# Patient Record
Sex: MALE | Race: WHITE | NOT HISPANIC OR LATINO
[De-identification: names, ages, dates, MRNs, and addresses within clinical notes are randomized per-mention and may not be internally consistent; named-entity substitution may affect disease eponyms.]

---

## 2018-08-15 PROBLEM — Z00.00 ENCOUNTER FOR PREVENTIVE HEALTH EXAMINATION: Status: ACTIVE | Noted: 2018-08-15

## 2018-09-14 ENCOUNTER — APPOINTMENT (OUTPATIENT)
Dept: ELECTROPHYSIOLOGY | Facility: CLINIC | Age: 73
End: 2018-09-14
Payer: COMMERCIAL

## 2018-09-14 ENCOUNTER — NON-APPOINTMENT (OUTPATIENT)
Age: 73
End: 2018-09-14

## 2018-09-14 VITALS
SYSTOLIC BLOOD PRESSURE: 117 MMHG | HEART RATE: 77 BPM | WEIGHT: 170 LBS | DIASTOLIC BLOOD PRESSURE: 77 MMHG | OXYGEN SATURATION: 98 % | BODY MASS INDEX: 25.76 KG/M2 | TEMPERATURE: 97.5 F | RESPIRATION RATE: 16 BRPM | HEIGHT: 68 IN

## 2018-09-14 DIAGNOSIS — Z84.89 FAMILY HISTORY OF OTHER SPECIFIED CONDITIONS: ICD-10-CM

## 2018-09-14 DIAGNOSIS — Z87.438 PERSONAL HISTORY OF OTHER DISEASES OF MALE GENITAL ORGANS: ICD-10-CM

## 2018-09-14 DIAGNOSIS — Z82.49 FAMILY HISTORY OF ISCHEMIC HEART DISEASE AND OTHER DISEASES OF THE CIRCULATORY SYSTEM: ICD-10-CM

## 2018-09-14 LAB
ALBUMIN SERPL ELPH-MCNC: 4.8 G/DL
ALP BLD-CCNC: 70 U/L
ALT SERPL-CCNC: 18 U/L
ANION GAP SERPL CALC-SCNC: 12 MMOL/L
AST SERPL-CCNC: 22 U/L
BASOPHILS # BLD AUTO: 0.02 K/UL
BASOPHILS NFR BLD AUTO: 0.4 %
BILIRUB SERPL-MCNC: 1.1 MG/DL
BUN SERPL-MCNC: 15 MG/DL
CALCIUM SERPL-MCNC: 9.5 MG/DL
CHLORIDE SERPL-SCNC: 102 MMOL/L
CO2 SERPL-SCNC: 27 MMOL/L
CREAT SERPL-MCNC: 1.34 MG/DL
EOSINOPHIL # BLD AUTO: 0.13 K/UL
EOSINOPHIL NFR BLD AUTO: 2.6 %
HCT VFR BLD CALC: 41.8 %
HGB BLD-MCNC: 14.2 G/DL
IMM GRANULOCYTES NFR BLD AUTO: 0.4 %
LYMPHOCYTES # BLD AUTO: 1.49 K/UL
LYMPHOCYTES NFR BLD AUTO: 29.4 %
MAN DIFF?: NORMAL
MCHC RBC-ENTMCNC: 29.1 PG
MCHC RBC-ENTMCNC: 34 GM/DL
MCV RBC AUTO: 85.7 FL
MONOCYTES # BLD AUTO: 0.54 K/UL
MONOCYTES NFR BLD AUTO: 10.7 %
NEUTROPHILS # BLD AUTO: 2.86 K/UL
NEUTROPHILS NFR BLD AUTO: 56.5 %
PLATELET # BLD AUTO: 178 K/UL
POTASSIUM SERPL-SCNC: 4.5 MMOL/L
PROT SERPL-MCNC: 7.6 G/DL
RBC # BLD: 4.88 M/UL
RBC # FLD: 13.7 %
SODIUM SERPL-SCNC: 141 MMOL/L
WBC # FLD AUTO: 5.06 K/UL

## 2018-09-14 PROCEDURE — 93000 ELECTROCARDIOGRAM COMPLETE: CPT

## 2018-09-14 PROCEDURE — 99205 OFFICE O/P NEW HI 60 MIN: CPT

## 2018-09-14 RX ORDER — CLOBETASOL PROPIONATE 0.5 MG/G
0.05 CREAM TOPICAL 3 TIMES DAILY
Refills: 0 | Status: ACTIVE | COMMUNITY
Start: 2018-09-14

## 2018-09-14 RX ORDER — SIMVASTATIN 20 MG/1
20 TABLET, FILM COATED ORAL DAILY
Qty: 90 | Refills: 3 | Status: DISCONTINUED | COMMUNITY
Start: 2018-09-14 | End: 2018-09-14

## 2018-09-14 RX ORDER — FINASTERIDE 5 MG/1
5 TABLET, FILM COATED ORAL DAILY
Refills: 0 | Status: ACTIVE | COMMUNITY
Start: 2018-09-14

## 2018-09-14 RX ORDER — ASPIRIN 325 MG/1
325 TABLET, FILM COATED ORAL DAILY
Refills: 0 | Status: DISCONTINUED | COMMUNITY
Start: 2018-09-14 | End: 2018-09-14

## 2018-09-14 RX ORDER — ALFUZOSIN HYDROCHLORIDE 10 MG/1
10 TABLET, EXTENDED RELEASE ORAL DAILY
Refills: 0 | Status: ACTIVE | COMMUNITY
Start: 2018-09-14

## 2018-09-17 LAB — ABO + RH PNL BLD: NORMAL

## 2018-10-04 ENCOUNTER — APPOINTMENT (OUTPATIENT)
Dept: CV DIAGNOSITCS | Facility: HOSPITAL | Age: 73
End: 2018-10-04
Payer: COMMERCIAL

## 2018-10-04 ENCOUNTER — OUTPATIENT (OUTPATIENT)
Dept: OUTPATIENT SERVICES | Facility: HOSPITAL | Age: 73
LOS: 1 days | End: 2018-10-04

## 2018-10-04 DIAGNOSIS — I48.91 UNSPECIFIED ATRIAL FIBRILLATION: ICD-10-CM

## 2018-10-04 DIAGNOSIS — K66.8 OTHER SPECIFIED DISORDERS OF PERITONEUM: Chronic | ICD-10-CM

## 2018-10-04 PROCEDURE — 93306 TTE W/DOPPLER COMPLETE: CPT | Mod: 26

## 2018-10-04 PROCEDURE — 76376 3D RENDER W/INTRP POSTPROCES: CPT | Mod: 26

## 2018-10-04 PROCEDURE — 93312 ECHO TRANSESOPHAGEAL: CPT | Mod: 26

## 2018-10-04 RX ORDER — SODIUM CHLORIDE 9 MG/ML
3 INJECTION INTRAMUSCULAR; INTRAVENOUS; SUBCUTANEOUS ONCE
Qty: 0 | Refills: 0 | Status: DISCONTINUED | OUTPATIENT
Start: 2018-10-04 | End: 2018-10-19

## 2018-10-08 ENCOUNTER — INPATIENT (INPATIENT)
Facility: HOSPITAL | Age: 73
LOS: 0 days | Discharge: HOME | End: 2018-10-09
Attending: INTERNAL MEDICINE | Admitting: INTERNAL MEDICINE
Payer: COMMERCIAL

## 2018-10-08 ENCOUNTER — TRANSCRIPTION ENCOUNTER (OUTPATIENT)
Age: 73
End: 2018-10-08

## 2018-10-08 VITALS
WEIGHT: 175.49 LBS | RESPIRATION RATE: 16 BRPM | HEART RATE: 95 BPM | OXYGEN SATURATION: 93 % | DIASTOLIC BLOOD PRESSURE: 63 MMHG | TEMPERATURE: 97 F | HEIGHT: 69 IN | SYSTOLIC BLOOD PRESSURE: 110 MMHG

## 2018-10-08 DIAGNOSIS — K66.8 OTHER SPECIFIED DISORDERS OF PERITONEUM: Chronic | ICD-10-CM

## 2018-10-08 DIAGNOSIS — I48.1 PERSISTENT ATRIAL FIBRILLATION: ICD-10-CM

## 2018-10-08 DIAGNOSIS — E78.5 HYPERLIPIDEMIA, UNSPECIFIED: ICD-10-CM

## 2018-10-08 DIAGNOSIS — N40.0 BENIGN PROSTATIC HYPERPLASIA WITHOUT LOWER URINARY TRACT SYMPTOMS: ICD-10-CM

## 2018-10-08 LAB
APTT BLD: 52.5 SEC — HIGH (ref 27.5–37.4)
BLD GP AB SCN SERPL QL: NEGATIVE — SIGNIFICANT CHANGE UP
BUN SERPL-MCNC: 14 MG/DL — SIGNIFICANT CHANGE UP (ref 7–23)
CALCIUM SERPL-MCNC: 8 MG/DL — LOW (ref 8.4–10.5)
CHLORIDE SERPL-SCNC: 106 MMOL/L — SIGNIFICANT CHANGE UP (ref 98–107)
CO2 SERPL-SCNC: 20 MMOL/L — LOW (ref 22–31)
CREAT SERPL-MCNC: 1.24 MG/DL — SIGNIFICANT CHANGE UP (ref 0.5–1.3)
GLUCOSE SERPL-MCNC: 187 MG/DL — HIGH (ref 70–99)
HCT VFR BLD CALC: 37.4 % — LOW (ref 39–50)
HGB BLD-MCNC: 12.6 G/DL — LOW (ref 13–17)
INR BLD: 1.28 — HIGH (ref 0.88–1.17)
MAGNESIUM SERPL-MCNC: 1.7 MG/DL — SIGNIFICANT CHANGE UP (ref 1.6–2.6)
MCHC RBC-ENTMCNC: 28.5 PG — SIGNIFICANT CHANGE UP (ref 27–34)
MCHC RBC-ENTMCNC: 33.7 % — SIGNIFICANT CHANGE UP (ref 32–36)
MCV RBC AUTO: 84.6 FL — SIGNIFICANT CHANGE UP (ref 80–100)
NRBC # FLD: 0 — SIGNIFICANT CHANGE UP
PHOSPHATE SERPL-MCNC: 3.7 MG/DL — SIGNIFICANT CHANGE UP (ref 2.5–4.5)
PLATELET # BLD AUTO: 142 K/UL — LOW (ref 150–400)
PMV BLD: 10 FL — SIGNIFICANT CHANGE UP (ref 7–13)
POTASSIUM SERPL-MCNC: 3.9 MMOL/L — SIGNIFICANT CHANGE UP (ref 3.5–5.3)
POTASSIUM SERPL-SCNC: 3.9 MMOL/L — SIGNIFICANT CHANGE UP (ref 3.5–5.3)
PROTHROM AB SERPL-ACNC: 14.3 SEC — HIGH (ref 9.8–13.1)
RBC # BLD: 4.42 M/UL — SIGNIFICANT CHANGE UP (ref 4.2–5.8)
RBC # FLD: 12.6 % — SIGNIFICANT CHANGE UP (ref 10.3–14.5)
RH IG SCN BLD-IMP: POSITIVE — SIGNIFICANT CHANGE UP
SODIUM SERPL-SCNC: 141 MMOL/L — SIGNIFICANT CHANGE UP (ref 135–145)
TSH SERPL-MCNC: 1.09 UIU/ML — SIGNIFICANT CHANGE UP (ref 0.27–4.2)
WBC # BLD: 11.75 K/UL — HIGH (ref 3.8–10.5)
WBC # FLD AUTO: 11.75 K/UL — HIGH (ref 3.8–10.5)

## 2018-10-08 PROCEDURE — 93613 INTRACARDIAC EPHYS 3D MAPG: CPT

## 2018-10-08 PROCEDURE — 93010 ELECTROCARDIOGRAM REPORT: CPT

## 2018-10-08 PROCEDURE — 93657 TX L/R ATRIAL FIB ADDL: CPT

## 2018-10-08 PROCEDURE — 93662 INTRACARDIAC ECG (ICE): CPT | Mod: 26

## 2018-10-08 PROCEDURE — 93656 COMPRE EP EVAL ABLTJ ATR FIB: CPT

## 2018-10-08 RX ORDER — HEPARIN SODIUM 5000 [USP'U]/ML
5000 INJECTION INTRAVENOUS; SUBCUTANEOUS EVERY 12 HOURS
Qty: 0 | Refills: 0 | Status: DISCONTINUED | OUTPATIENT
Start: 2018-10-08 | End: 2018-10-08

## 2018-10-08 RX ORDER — TAMSULOSIN HYDROCHLORIDE 0.4 MG/1
0.4 CAPSULE ORAL AT BEDTIME
Qty: 0 | Refills: 0 | Status: DISCONTINUED | OUTPATIENT
Start: 2018-10-08 | End: 2018-10-09

## 2018-10-08 RX ORDER — APIXABAN 2.5 MG/1
5 TABLET, FILM COATED ORAL EVERY 12 HOURS
Qty: 0 | Refills: 0 | Status: DISCONTINUED | OUTPATIENT
Start: 2018-10-08 | End: 2018-10-09

## 2018-10-08 RX ORDER — SUCRALFATE 1 G
1 TABLET ORAL EVERY 6 HOURS
Qty: 0 | Refills: 0 | Status: DISCONTINUED | OUTPATIENT
Start: 2018-10-08 | End: 2018-10-09

## 2018-10-08 RX ORDER — FINASTERIDE 5 MG/1
5 TABLET, FILM COATED ORAL DAILY
Qty: 0 | Refills: 0 | Status: DISCONTINUED | OUTPATIENT
Start: 2018-10-08 | End: 2018-10-09

## 2018-10-08 RX ORDER — ATORVASTATIN CALCIUM 80 MG/1
20 TABLET, FILM COATED ORAL AT BEDTIME
Qty: 0 | Refills: 0 | Status: DISCONTINUED | OUTPATIENT
Start: 2018-10-08 | End: 2018-10-09

## 2018-10-08 RX ORDER — PANTOPRAZOLE SODIUM 20 MG/1
40 TABLET, DELAYED RELEASE ORAL DAILY
Qty: 0 | Refills: 0 | Status: DISCONTINUED | OUTPATIENT
Start: 2018-10-08 | End: 2018-10-09

## 2018-10-08 RX ORDER — SODIUM CHLORIDE 9 MG/ML
3 INJECTION INTRAMUSCULAR; INTRAVENOUS; SUBCUTANEOUS EVERY 8 HOURS
Qty: 0 | Refills: 0 | Status: DISCONTINUED | OUTPATIENT
Start: 2018-10-08 | End: 2018-10-09

## 2018-10-08 RX ORDER — APIXABAN 2.5 MG/1
5 TABLET, FILM COATED ORAL EVERY 12 HOURS
Qty: 0 | Refills: 0 | Status: DISCONTINUED | OUTPATIENT
Start: 2018-10-08 | End: 2018-10-08

## 2018-10-08 RX ORDER — SUCRALFATE 1 G
1 TABLET ORAL EVERY 12 HOURS
Qty: 0 | Refills: 0 | Status: DISCONTINUED | OUTPATIENT
Start: 2018-10-08 | End: 2018-10-08

## 2018-10-08 RX ADMIN — SODIUM CHLORIDE 3 MILLILITER(S): 9 INJECTION INTRAMUSCULAR; INTRAVENOUS; SUBCUTANEOUS at 17:44

## 2018-10-08 RX ADMIN — APIXABAN 5 MILLIGRAM(S): 2.5 TABLET, FILM COATED ORAL at 22:05

## 2018-10-08 RX ADMIN — PANTOPRAZOLE SODIUM 40 MILLIGRAM(S): 20 TABLET, DELAYED RELEASE ORAL at 18:18

## 2018-10-08 RX ADMIN — SODIUM CHLORIDE 3 MILLILITER(S): 9 INJECTION INTRAMUSCULAR; INTRAVENOUS; SUBCUTANEOUS at 22:00

## 2018-10-08 RX ADMIN — ATORVASTATIN CALCIUM 20 MILLIGRAM(S): 80 TABLET, FILM COATED ORAL at 22:05

## 2018-10-08 RX ADMIN — Medication 1 GRAM(S): at 18:18

## 2018-10-08 RX ADMIN — FINASTERIDE 5 MILLIGRAM(S): 5 TABLET, FILM COATED ORAL at 18:18

## 2018-10-08 RX ADMIN — TAMSULOSIN HYDROCHLORIDE 0.4 MILLIGRAM(S): 0.4 CAPSULE ORAL at 22:05

## 2018-10-08 NOTE — DISCHARGE NOTE ADULT - CARE PLAN
Principal Discharge DX:	Persistent atrial fibrillation  Goal:	To control your rate and rhythm.  Assessment and plan of treatment:	You underwent an ablation procedure where your rhythm was changed to a normal one. This was successful and you are no in a regular heart rhythm. You will have to continue your blood thinning medication (Apixaban), and you will follow up with your EP doctor (Dr. Chu).  Secondary Diagnosis:	Hyperlipidemia, unspecified hyperlipidemia type  Goal:	Continuing your home medications.  Assessment and plan of treatment:	You were continued on your home medications as prescribed.  Secondary Diagnosis:	Benign prostatic hyperplasia, unspecified whether lower urinary tract symptoms present  Goal:	Giving you medications for your BPH.  Assessment and plan of treatment:	You were given medications for your BPH. You will continue your home regimen once discharged (Finasteride and Alfuzosin) Principal Discharge DX:	Persistent atrial fibrillation  Goal:	To control your rate and rhythm.  Assessment and plan of treatment:	You underwent an ablation procedure where your rhythm was changed to a normal one. This was successful and you are no in a regular heart rhythm. You will have to continue your blood thinning medication (Apixaban), and you will follow up with your EP doctor (Dr. Chu).  Secondary Diagnosis:	Hyperlipidemia, unspecified hyperlipidemia type  Goal:	Continuing your home medications.  Assessment and plan of treatment:	You were continued on your home medications as prescribed.  Secondary Diagnosis:	Benign prostatic hyperplasia, unspecified whether lower urinary tract symptoms present  Goal:	Giving you medications for your BPH.  Assessment and plan of treatment:	You were given medications for your BPH. You will continue your home regimen once discharged (Finasteride and Alfuzosin).  Your outpatient Urologist (Dr. Canales) was contacted and will follow up with him immediately after leaving the hospital.

## 2018-10-08 NOTE — DISCHARGE NOTE ADULT - PLAN OF CARE
To control your rate and rhythm. You underwent an ablation procedure where your rhythm was changed to a normal one. This was successful and you are no in a regular heart rhythm. You will have to continue your blood thinning medication (Apixaban), and you will follow up with your EP doctor (Dr. Chu). Continuing your home medications. You were continued on your home medications as prescribed. Giving you medications for your BPH. You were given medications for your BPH. You will continue your home regimen once discharged (Finasteride and Alfuzosin) You were given medications for your BPH. You will continue your home regimen once discharged (Finasteride and Alfuzosin).  Your outpatient Urologist (Dr. Canales) was contacted and will follow up with him immediately after leaving the hospital.

## 2018-10-08 NOTE — PROGRESS NOTE ADULT - ASSESSMENT
74 y/o male with PMHx of Atrial Fibrillation (s/p 2 DCCV attempts), BPH, HLD, is now transferred to CCU s/p successful Atrial fibrillation in stable condition.

## 2018-10-08 NOTE — H&P CARDIOLOGY - REVIEW OF SYSTEMS
The patient denies chest pain, SOB, palpitations, dizziness, b/l lower extremities edema, presyncope, syncope, melena, hematochezia, fever, chills, abdominal pain, N/v/D/C, urinary symptoms.

## 2018-10-08 NOTE — PROGRESS NOTE ADULT - SUBJECTIVE AND OBJECTIVE BOX
Patient seen and examined at bedside.    Interval Events: Patient is status post Atrial Fibrillation ablation.     Review Of Systems: No chest pain, shortness of breath, or palpitations            Current Meds:  atorvastatin 20 milliGRAM(s) Oral at bedtime  finasteride 5 milliGRAM(s) Oral daily  pantoprazole  Injectable 40 milliGRAM(s) IV Push daily  sodium chloride 0.9% lock flush 3 milliLiter(s) IV Push every 8 hours  sucralfate 1 Gram(s) Oral every 6 hours  tamsulosin 0.4 milliGRAM(s) Oral at bedtime      Vitals:  T(F): Afebrile  HR: 91  BP: 110/63  RR: 14  SpO2: 93% Face Mask   I&O's Summary      Physical Exam:  Appearance: No acute distress; well appearing  Eyes: PERRL, EOMI, pink conjunctiva  HENT: Normal oral mucosa  Cardiovascular: RRR, S1,S2 3/6 systolic ejection murmur loudest at M area.   Respiratory: Clear to auscultation bilaterally  Gastrointestinal: soft, non-tender, non-distended with normal bowel sounds  Musculoskeletal: No clubbing; no joint deformity   Neurologic: Non-focal  Lymphatic: No lymphadenopathy  Psychiatry: AAOx3, mood & affect appropriate  Skin: No rashes, ecchymoses, or cyanosis. Access site c/d/i       New ECG(s): Personally reviewed    Echo:    < from: [facility] w/TTE (w/3D Echo) (10.04.18 @ 07:56) >  DIMENSIONS:  Dimensions:     Normal Values:  LA:     4.3 cm    2.0 - 4.0 cm  Ao:     4.0 cm    2.0 - 3.8 cm  SEPTUM: 0.8 cm    0.6 - 1.2 cm  PWT:    0.8 cm    0.6 - 1.1 cm  LVIDd:  5.0 cm    3.0 - 5.6 cm  LVIDs:  3.3 cm    1.8 - 4.0 cm  Derived Variables:  LVMI: 71 g/m2  RWT: 0.32  Fractional short: 34 %  Ejection Fraction (Cristobalicholtz): 63 %  ------------------------------------------------------------------------  OBSERVATIONS:  Mitral Valve: Bileaflet mitral valve prolapse.  Moderate-severe mitral regurgitation.  Two jets of MR are  noted - one is central in origin and direction; the other  is highly eccentric and posteriorly directed.  Blunting of  systolic pulmonary venous flow is noted.  Aortic Root: Normal aortic root, aortic arch, and  descending thoracic aorta.  Aortic Valve: Calcified trileaflet aortic valve with normal  opening. Mild-moderate aortic regurgitation.  Vena  contracta width about  0.3 cm.  Left Atrium: Severely dilated left atrium.  LA volume index  = 59 cc/m2.  No left atrium or left atrial appendage  thrombus.  Left Ventricle: Normal left ventricular systolic function.  No segmental wall motion abnormalities. Normal left  ventricular internal dimensions and wall thicknesses.  Right Heart: Mild right atrial enlargement. Normal right  ventricular size and function. Normal tricuspid valve.  Mild tricuspid regurgitation. Normal pulmonic valve.  Mild-moderate pulmonic regurgitation.  Pericardium/PleuraNormal pericardium with no pericardial  effusion.  ------------------------------------------------------------------------  CONCLUSIONS:  1. Bileaflet mitral valve prolapse. Moderate-severe mitral  regurgitation.  Two jets of MR are noted - one is central  in origin and direction; the other is highly eccentric and  posteriorly directed.  Blunting of systolic pulmonary  venous flow is noted.  2. Calcified trileaflet aortic valve with normal opening.  Mild-moderate aortic regurgitation.  Vena contracta width  about  0.3 cm.  3. Normal aortic root, aortic arch, and descending thoracic  aorta.  4. Severely dilated left atrium.  LA volume index = 59  cc/m2.  No left atrium or left atrial appendage thrombus.  5. Normal left ventricular internal dimensions and wall  thicknesses.  6. Normal left ventricular systolic function. No segmental  wall motion abnormalities.  7. Normal right ventricular size and function.  8. Contrast injection demonstrates no evidence of a patent  foramen ovale.    < end of copied text >      Interpretation of Telemetry: Normal Sinus Rhythm at 90's.

## 2018-10-08 NOTE — H&P CARDIOLOGY - HISTORY OF PRESENT ILLNESS
73 y.o. male presents today for elective A. Fib. Ablation.   see hard copy of H&P from [employer] in patient's chart.   The patient denies any new complaints since the last time he was seen by Dr. Chu.

## 2018-10-08 NOTE — PROGRESS NOTE ADULT - PROBLEM SELECTOR PLAN 1
Now s/p Atrial Fibrillation now converted to NSR not on rate control at home.     - Will resume anticoagulation 6 hours after procedure 8PM. Now s/p Atrial Fibrillation now converted to NSR not on rate control at home.     - Will resume anticoagulation (Apixaban 5 mg BID) 6 hours after procedure at 8PM 10/8.

## 2018-10-08 NOTE — PROGRESS NOTE ADULT - PROBLEM SELECTOR PLAN 2
- Will start Tamsulosin inpatient, patient can resume home BPH meds after discharge.  - c/w Finasteride 5 mg PO qd.

## 2018-10-08 NOTE — DISCHARGE NOTE ADULT - CONDITIONS AT DISCHARGE
Patient A&O, making needs known, pain free.  Patient right groin site with ecchymosis noted, soft, no hematoma. B/L LE cool to touch, +doppler pulses.  Vital signs stable.  Patient with difficulty to empty bladder completely.  MD aware.  Patient being discharged to urologist's office.

## 2018-10-08 NOTE — DISCHARGE NOTE ADULT - HOSPITAL COURSE
74 y/o male with PMHx of Atrial Fibrillation (s/p 2 DCCV attempts), BPH, HLD underwent successful Atiral Fibrillation ablation and being cared for in CCU restarted on AC 6 hours after procedure (Apixaban 5 mg BID). 72 y/o male with PMHx of Atrial Fibrillation (s/p 2 DCCV attempts), BPH, HLD underwent successful Atiral Fibrillation ablation and being cared for in CCU restarted on AC 6 hours after procedure (Apixaban 5 mg BID). Patent needed a chris placed in, and was taken out with a TOV. 72 y/o male with PMHx of Atrial Fibrillation (s/p 2 DCCV attempts), BPH, HLD underwent successful Atiral Fibrillation ablation and being cared for in CCU restarted on AC 6 hours after procedure (Apixaban 5 mg BID). Patent needed a chris placed in, and was taken out with a TOV however patient has had PVR greater than 300 cc. Patient did not feel comfortable putting the chris in again and wanted to edie. His outpatient Urologist was contacted and will follow up with him immediately after leaving the hospital. Clinically stable for discharge. 74 y/o male with PMHx of Atrial Fibrillation (s/p 2 DCCV attempts), BPH, HLD underwent successful Atiral Fibrillation ablation and being cared for in CCU restarted on AC 6 hours after procedure (Apixaban 5 mg BID). Patient with hx of BPH, post procedure noted to have some difficulty urinating however passed TOV. Case discussed with Urology and close patient follow up scheduled. Clinically stable for discharge.

## 2018-10-08 NOTE — DISCHARGE NOTE ADULT - ADDITIONAL INSTRUCTIONS
You will follow up with your EP doctor (Dr. Chu). You will follow up with your EP doctor (Dr. Chu) on 10/23/18 4:30PM at his office. You will follow up with your EP doctor (Dr. Chu) on 10/23/18 4:30PM at his office.  You will follow up with your Urologist Dr. Canales immediately after being discharged from the hospital.

## 2018-10-08 NOTE — DISCHARGE NOTE ADULT - OTHER SIGNIFICANT FINDINGS
< from: [facility] w/TTE (w/3D Echo) (10.04.18 @ 07:56) >  CONCLUSIONS:  1. Bileaflet mitral valve prolapse. Moderate-severe mitral  regurgitation.  Two jets of MR are noted - one is central  in origin and direction; the other is highly eccentric and  posteriorly directed.  Blunting of systolic pulmonary  venous flow is noted.  2. Calcified trileaflet aortic valve with normal opening.  Mild-moderate aortic regurgitation.  Vena contracta width  about  0.3 cm.  3. Normal aortic root, aortic arch, and descending thoracic  aorta.  4. Severely dilated left atrium.  LA volume index = 59  cc/m2.  No left atrium or left atrial appendage thrombus.  5. Normal left ventricular internal dimensions and wall  thicknesses.  6. Normal left ventricular systolic function. No segmental  wall motion abnormalities.  7. Normal right ventricular size and function.  8. Contrast injection demonstrates no evidence of a patent  foramen ovale.    < end of copied text >

## 2018-10-08 NOTE — DISCHARGE NOTE ADULT - INSTRUCTIONS
Continue your diet as you were prior to admission. Monitor right groin site for swelling, bruising, drainage, bleeding, or pain not relieved by tylenol

## 2018-10-08 NOTE — DISCHARGE NOTE ADULT - MEDICATION SUMMARY - MEDICATIONS TO TAKE
I will START or STAY ON the medications listed below when I get home from the hospital:    finasteride 5 mg oral tablet  -- 1 tab(s) by mouth once a day  -- Indication: For Benign prostatic hyperplasia, unspecified whether lower urinary tract symptoms present    alfuzosin 10 mg oral tablet, extended release  -- 1 tab(s) by mouth once a day  -- Indication: For Benign prostatic hyperplasia, unspecified whether lower urinary tract symptoms present    Eliquis 5 mg oral tablet  -- 1 tab(s) by mouth 2 times a day  -- Indication: For Persistent atrial fibrillation    Lipitor 20 mg oral tablet  -- 1 tab(s) by mouth Tuesday, Thursday, Saturday, Sunday  -- Indication: For Hyperlipidemia, unspecified hyperlipidemia type    clobetasol topical 0.05% topical cream  -- Apply on skin to affected area 2 times a day  -- Indication: For Benign prostatic hyperplasia, unspecified whether lower urinary tract symptoms present    sucralfate 1 g oral tablet  -- 1 tab(s) by mouth every 6 hours  -- Indication: For Persistent atrial fibrillation    PriLOSEC OTC 20 mg oral delayed release tablet  -- 1 tab(s) by mouth once a day   -- Obtain medical advice before taking any non-prescription drugs as some may affect the action of this medication.  Swallow whole.  Do not crush.    -- Indication: For Persistent atrial fibrillation

## 2018-10-08 NOTE — DISCHARGE NOTE ADULT - SECONDARY DIAGNOSIS.
Hyperlipidemia, unspecified hyperlipidemia type Benign prostatic hyperplasia, unspecified whether lower urinary tract symptoms present

## 2018-10-08 NOTE — DISCHARGE NOTE ADULT - PATIENT PORTAL LINK FT
You can access the [email or web address] Patient Portal, offered by [facility], by registering with the following website: [email or web address]

## 2018-10-08 NOTE — CHART NOTE - NSCHARTNOTEFT_GEN_A_CORE
Type of Procedure: pulmonary vein isolation and atrial flutter ablation  Licensed independent practitioner: Fransico Chu MD  Assistant: none  Description of procedure: sterile conditions, right femoral venous access, heparin, transseptal, PVI x 4, atrial flutter ablation  Findings of procedure: PVI, CTI atrial flutter  Estimated blood loss: < 10 cc  Specimen removed: none  Preoperative Dx: persistent atrial fibrillation  Postoperative Dx: persistent atrial fibrillation and CTI atrial flutter  Complications: none  Anesthesia type: general with local  Resume anticoagulation after 6 hours    Fransico Chu MD

## 2018-10-09 VITALS
HEART RATE: 90 BPM | OXYGEN SATURATION: 99 % | SYSTOLIC BLOOD PRESSURE: 120 MMHG | RESPIRATION RATE: 17 BRPM | DIASTOLIC BLOOD PRESSURE: 54 MMHG

## 2018-10-09 LAB
ALBUMIN SERPL ELPH-MCNC: 3.7 G/DL — SIGNIFICANT CHANGE UP (ref 3.3–5)
ALP SERPL-CCNC: 58 U/L — SIGNIFICANT CHANGE UP (ref 40–120)
ALT FLD-CCNC: 14 U/L — SIGNIFICANT CHANGE UP (ref 4–41)
APPEARANCE UR: CLEAR — SIGNIFICANT CHANGE UP
AST SERPL-CCNC: 27 U/L — SIGNIFICANT CHANGE UP (ref 4–40)
BACTERIA # UR AUTO: NEGATIVE — SIGNIFICANT CHANGE UP
BASOPHILS # BLD AUTO: 0.01 K/UL — SIGNIFICANT CHANGE UP (ref 0–0.2)
BASOPHILS NFR BLD AUTO: 0.1 % — SIGNIFICANT CHANGE UP (ref 0–2)
BILIRUB SERPL-MCNC: 1 MG/DL — SIGNIFICANT CHANGE UP (ref 0.2–1.2)
BILIRUB UR-MCNC: NEGATIVE — SIGNIFICANT CHANGE UP
BLOOD UR QL VISUAL: HIGH
BUN SERPL-MCNC: 14 MG/DL — SIGNIFICANT CHANGE UP (ref 7–23)
CALCIUM SERPL-MCNC: 7.8 MG/DL — LOW (ref 8.4–10.5)
CHLORIDE SERPL-SCNC: 105 MMOL/L — SIGNIFICANT CHANGE UP (ref 98–107)
CO2 SERPL-SCNC: 21 MMOL/L — LOW (ref 22–31)
COLOR SPEC: YELLOW — SIGNIFICANT CHANGE UP
CREAT SERPL-MCNC: 1.14 MG/DL — SIGNIFICANT CHANGE UP (ref 0.5–1.3)
EOSINOPHIL # BLD AUTO: 0 K/UL — SIGNIFICANT CHANGE UP (ref 0–0.5)
EOSINOPHIL NFR BLD AUTO: 0 % — SIGNIFICANT CHANGE UP (ref 0–6)
GLUCOSE SERPL-MCNC: 138 MG/DL — HIGH (ref 70–99)
GLUCOSE UR-MCNC: NEGATIVE — SIGNIFICANT CHANGE UP
HCT VFR BLD CALC: 35.7 % — LOW (ref 39–50)
HGB BLD-MCNC: 12.1 G/DL — LOW (ref 13–17)
HYALINE CASTS # UR AUTO: SIGNIFICANT CHANGE UP
IMM GRANULOCYTES # BLD AUTO: 0.07 # — SIGNIFICANT CHANGE UP
IMM GRANULOCYTES NFR BLD AUTO: 0.7 % — SIGNIFICANT CHANGE UP (ref 0–1.5)
KETONES UR-MCNC: SIGNIFICANT CHANGE UP
LEUKOCYTE ESTERASE UR-ACNC: NEGATIVE — SIGNIFICANT CHANGE UP
LYMPHOCYTES # BLD AUTO: 0.6 K/UL — LOW (ref 1–3.3)
LYMPHOCYTES # BLD AUTO: 5.7 % — LOW (ref 13–44)
MAGNESIUM SERPL-MCNC: 1.9 MG/DL — SIGNIFICANT CHANGE UP (ref 1.6–2.6)
MCHC RBC-ENTMCNC: 29.1 PG — SIGNIFICANT CHANGE UP (ref 27–34)
MCHC RBC-ENTMCNC: 33.9 % — SIGNIFICANT CHANGE UP (ref 32–36)
MCV RBC AUTO: 85.8 FL — SIGNIFICANT CHANGE UP (ref 80–100)
MONOCYTES # BLD AUTO: 1.25 K/UL — HIGH (ref 0–0.9)
MONOCYTES NFR BLD AUTO: 11.9 % — SIGNIFICANT CHANGE UP (ref 2–14)
NEUTROPHILS # BLD AUTO: 8.58 K/UL — HIGH (ref 1.8–7.4)
NEUTROPHILS NFR BLD AUTO: 81.6 % — HIGH (ref 43–77)
NITRITE UR-MCNC: NEGATIVE — SIGNIFICANT CHANGE UP
NRBC # FLD: 0 — SIGNIFICANT CHANGE UP
PH UR: 6 — SIGNIFICANT CHANGE UP (ref 5–8)
PHOSPHATE SERPL-MCNC: 3.4 MG/DL — SIGNIFICANT CHANGE UP (ref 2.5–4.5)
PLATELET # BLD AUTO: 144 K/UL — LOW (ref 150–400)
PMV BLD: 10.4 FL — SIGNIFICANT CHANGE UP (ref 7–13)
POTASSIUM SERPL-MCNC: 4.3 MMOL/L — SIGNIFICANT CHANGE UP (ref 3.5–5.3)
POTASSIUM SERPL-SCNC: 4.3 MMOL/L — SIGNIFICANT CHANGE UP (ref 3.5–5.3)
PROT SERPL-MCNC: 5.9 G/DL — LOW (ref 6–8.3)
PROT UR-MCNC: NEGATIVE — SIGNIFICANT CHANGE UP
RBC # BLD: 4.16 M/UL — LOW (ref 4.2–5.8)
RBC # FLD: 12.9 % — SIGNIFICANT CHANGE UP (ref 10.3–14.5)
RBC CASTS # UR COMP ASSIST: >50 — HIGH (ref 0–?)
SODIUM SERPL-SCNC: 138 MMOL/L — SIGNIFICANT CHANGE UP (ref 135–145)
SP GR SPEC: 1.02 — SIGNIFICANT CHANGE UP (ref 1–1.04)
SQUAMOUS # UR AUTO: SIGNIFICANT CHANGE UP
UROBILINOGEN FLD QL: NORMAL — SIGNIFICANT CHANGE UP
WBC # BLD: 10.51 K/UL — HIGH (ref 3.8–10.5)
WBC # FLD AUTO: 10.51 K/UL — HIGH (ref 3.8–10.5)
WBC UR QL: SIGNIFICANT CHANGE UP (ref 0–?)

## 2018-10-09 PROCEDURE — 99233 SBSQ HOSP IP/OBS HIGH 50: CPT

## 2018-10-09 RX ORDER — OMEPRAZOLE 10 MG/1
1 CAPSULE, DELAYED RELEASE ORAL
Qty: 30 | Refills: 0
Start: 2018-10-09 | End: 2018-11-07

## 2018-10-09 RX ORDER — SUCRALFATE 1 G
1 TABLET ORAL
Qty: 120 | Refills: 0
Start: 2018-10-09 | End: 2018-11-07

## 2018-10-09 RX ORDER — APIXABAN 2.5 MG/1
1 TABLET, FILM COATED ORAL
Qty: 0 | Refills: 0 | COMMUNITY

## 2018-10-09 RX ORDER — APIXABAN 2.5 MG/1
1 TABLET, FILM COATED ORAL
Qty: 60 | Refills: 0
Start: 2018-10-09 | End: 2018-11-07

## 2018-10-09 RX ADMIN — SODIUM CHLORIDE 3 MILLILITER(S): 9 INJECTION INTRAMUSCULAR; INTRAVENOUS; SUBCUTANEOUS at 05:45

## 2018-10-09 RX ADMIN — Medication 1 GRAM(S): at 08:25

## 2018-10-09 RX ADMIN — PANTOPRAZOLE SODIUM 40 MILLIGRAM(S): 20 TABLET, DELAYED RELEASE ORAL at 12:01

## 2018-10-09 RX ADMIN — Medication 1 GRAM(S): at 01:56

## 2018-10-09 RX ADMIN — SODIUM CHLORIDE 3 MILLILITER(S): 9 INJECTION INTRAMUSCULAR; INTRAVENOUS; SUBCUTANEOUS at 13:42

## 2018-10-09 RX ADMIN — Medication 1 GRAM(S): at 12:01

## 2018-10-09 RX ADMIN — APIXABAN 5 MILLIGRAM(S): 2.5 TABLET, FILM COATED ORAL at 11:26

## 2018-10-09 NOTE — PROGRESS NOTE ADULT - ASSESSMENT
72 y/o male with PMHx of Atrial Fibrillation (s/p 2 DCCV attempts), BPH, HLD, is now transferred to CCU s/p successful Atrial fibrillation and restarted on home AC. Maintaining sinus rhythm. Post procedure instructions given to patient and he demonstrates understanding of the instructions. Right groin clean, dry and intact with no bleeding or hematoma.   - Continue Apixaban 5 mg PO BID.  - Continue other home medications  - May D/C home patient is able to urinate (S/p Muir removal) 74 y/o male with PMHx of Atrial Fibrillation (s/p 2 DCCV attempts), BPH, HLD, is now transferred to CCU s/p successful Atrial fibrillation and restarted on home AC. Maintaining sinus rhythm. Post procedure instructions given to patient and he demonstrates understanding of the instructions. Right groin clean, dry and intact with no bleeding or hematoma.   - Continue Apixaban 5 mg PO BID.  - Continue other home medications  - May D/C home patient is able to urinate (S/p Muir removal)  - F/u appointment with device clinic on 10/23/18 @ 4:30 pm

## 2018-10-09 NOTE — PROGRESS NOTE ADULT - ASSESSMENT
74 y/o male with PMHx of Atrial Fibrillation (s/p 2 DCCV attempts), BPH, HLD, is now transferred to CCU s/p successful Atrial fibrillation in stable condition with a-line now removed and restarted on home AC.

## 2018-10-09 NOTE — PROGRESS NOTE ADULT - SUBJECTIVE AND OBJECTIVE BOX
Patient is seen and examined. S/P afib ablation yesterday. No further afib noted on telemetry. Denies chest pain, SOB, palpitations or dizziness.   PAST MEDICAL & SURGICAL HISTORY:  BPH (benign prostatic hyperplasia)  Afib: on Eliquis  Hypertension  Hyperlipidemia  Abdominal cyst: s/p anal cyst removal      MEDICATIONS  (STANDING):  apixaban 5 milliGRAM(s) Oral every 12 hours  atorvastatin 20 milliGRAM(s) Oral at bedtime  finasteride 5 milliGRAM(s) Oral daily  pantoprazole  Injectable 40 milliGRAM(s) IV Push daily  sodium chloride 0.9% lock flush 3 milliLiter(s) IV Push every 8 hours  sucralfate 1 Gram(s) Oral every 6 hours  tamsulosin 0.4 milliGRAM(s) Oral at bedtime    MEDICATIONS  (PRN):    Vital Signs Last 24 Hrs  T(C): 36.7 (09 Oct 2018 07:30), Max: 36.7 (09 Oct 2018 00:00)  T(F): 98.1 (09 Oct 2018 07:30), Max: 98.1 (09 Oct 2018 00:00)  HR: 86 (09 Oct 2018 10:00) (79 - 99)  BP: 106/51 (09 Oct 2018 10:00) (105/53 - 126/75)  BP(mean): 64 (09 Oct 2018 10:00) (64 - 85)  RR: 20 (09 Oct 2018 10:00) (14 - 21)  SpO2: 98% (09 Oct 2018 10:00) (93% - 100%)    INTERPRETATION OF TELEMETRY: Sinus rhythm with HR 70s-80s    LABS:                        12.1   10.51 )-----------( 144      ( 09 Oct 2018 02:45 )             35.7     10-09    138  |  105  |  14  ----------------------------<  138<H>  4.3   |  21<L>  |  1.14    Ca    7.8<L>      09 Oct 2018 02:45  Phos  3.4     10  Mg     1.9     10-09    TPro  5.9<L>  /  Alb  3.7  /  TBili  1.0  /  DBili  x   /  AST  27  /  ALT  14  /  AlkPhos  58  10-09        PT/INR - ( 08 Oct 2018 15:30 )   PT: 14.3 SEC;   INR: 1.28          PTT - ( 08 Oct 2018 15:30 )  PTT:52.5 SEC  Urinalysis Basic - ( 09 Oct 2018 01:30 )    Color: YELLOW / Appearance: CLEAR / S.017 / pH: 6.0  Gluc: NEGATIVE / Ketone: TRACE  / Bili: NEGATIVE / Urobili: NORMAL   Blood: MODERATE / Protein: NEGATIVE / Nitrite: NEGATIVE   Leuk Esterase: NEGATIVE / RBC: >50 / WBC 0-2   Sq Epi: OCC / Non Sq Epi: x / Bacteria: NEGATIVE      I&O's Summary    08 Oct 2018 07:  -  09 Oct 2018 07:00  --------------------------------------------------------  IN: 300 mL / OUT: 1000 mL / NET: -700 mL    09 Oct 2018 07:  -  09 Oct 2018 10:45  --------------------------------------------------------  IN: 390 mL / OUT: 0 mL / NET: 390 mL          PHYSICAL EXAM:    GENERAL: In no apparent distress, well nourished, and hydrated.  HEAD:  Atraumatic, Normocephalic  HEART: Regular rate and rhythm; No murmurs, rubs, or gallops.  PULMONARY: Clear to auscultation and percussion.  No rales, wheezing, or rhonchi bilaterally.  ABDOMEN: Soft, Nontender, Nondistended; Bowel sounds present  EXTREMITIES:  2+ Peripheral Pulses, No clubbing, cyanosis, or edema

## 2018-10-09 NOTE — PROGRESS NOTE ADULT - PROBLEM SELECTOR PLAN 1
Now s/p Atrial Fibrillation now converted to NSR not on rate control at home.     - Resumed AC (Apixaban 5 mg PO BID).  - Has remained in NSR throughout the night.

## 2018-10-09 NOTE — PROGRESS NOTE ADULT - SUBJECTIVE AND OBJECTIVE BOX
Patient seen and examined at bedside.    Overnight Events: No acute events. Patient was having difficulty voiding a chris was placed. It was removed at approximately 6:30AM. A-line was removed.     Review Of Systems: No chest pain, shortness of breath, or palpitations. Denies any pain or discomfort at right femoral access site.        Current Meds:  apixaban 5 milliGRAM(s) Oral every 12 hours  atorvastatin 20 milliGRAM(s) Oral at bedtime  finasteride 5 milliGRAM(s) Oral daily  pantoprazole  Injectable 40 milliGRAM(s) IV Push daily  sodium chloride 0.9% lock flush 3 milliLiter(s) IV Push every 8 hours  sucralfate 1 Gram(s) Oral every 6 hours  tamsulosin 0.4 milliGRAM(s) Oral at bedtime      Vitals:  T(F): 98.1 (10-09), Max: 98.1 (10-09)  HR: 85 (10-09) (79 - 99)  BP: 126/75 (10-08) (110/63 - 126/75)  RR: 18 (10-09)  SpO2: 97% (10-09)  I&O's Summary    08 Oct 2018 07:01  -  09 Oct 2018 07:00  --------------------------------------------------------  IN: 300 mL / OUT: 1000 mL / NET: -700 mL        Physical Exam:  Appearance: No acute distress; well appearing  Eyes: PERRL, EOMI, pink conjunctiva  HENT: Normal oral mucosa  Cardiovascular: RRR, S1,S2 3/6 systolic ejection murmur loudest at M area.   Respiratory: Clear to auscultation bilaterally  Gastrointestinal: soft, non-tender, non-distended with normal bowel sounds  Musculoskeletal: No clubbing; no joint deformity   Neurologic: Non-focal  Lymphatic: No lymphadenopathy  Psychiatry: AAOx3, mood & affect appropriate  Skin: No rashes, ecchymoses, or cyanosis. Access site c/d/i                           12.1   10.51 )-----------( 144      ( 09 Oct 2018 02:45 )             35.7     10-09    138  |  105  |  14  ----------------------------<  138<H>  4.3   |  21<L>  |  1.14    Ca    7.8<L>      09 Oct 2018 02:45  Phos  3.4     10-09  Mg     1.9     10-09    TPro  5.9<L>  /  Alb  3.7  /  TBili  1.0  /  DBili  x   /  AST  27  /  ALT  14  /  AlkPhos  58  10-09    PT/INR - ( 08 Oct 2018 15:30 )   PT: 14.3 SEC;   INR: 1.28          PTT - ( 08 Oct 2018 15:30 )  PTT:52.5 SEC        Interpretation of Telemetry: NSR

## 2018-10-09 NOTE — CONSULT NOTE ADULT - SUBJECTIVE AND OBJECTIVE BOX
Patient is a 73y old  Male who presents with a chief complaint of Atrial Fibrillation Ablation (09 Oct 2018 07:24)      INTERVAL HPI/OVERNIGHT EVENTS: none     MEDICATIONS  (STANDING):  apixaban 5 milliGRAM(s) Oral every 12 hours  atorvastatin 20 milliGRAM(s) Oral at bedtime  finasteride 5 milliGRAM(s) Oral daily  pantoprazole  Injectable 40 milliGRAM(s) IV Push daily  sodium chloride 0.9% lock flush 3 milliLiter(s) IV Push every 8 hours  sucralfate 1 Gram(s) Oral every 6 hours  tamsulosin 0.4 milliGRAM(s) Oral at bedtime    MEDICATIONS  (PRN):            Allergies    penicillin (Hives)    Intolerances        REVIEW OF SYSTEMS:  CARDIOVASCULAR: No chest pain, palpitations, dizziness, or leg swelling; no shortness of breath     RESPIRATORY: No cough, wheezing, chills or hemoptysis; No shortness of breath    GASTROINTESTINAL: No abdominal or epigastric pain. No nausea, vomiting, or hematemesis; No diarrhea or constipation. No melena or hematochezia.    NEUROLOGICAL: No headaches, memory loss, loss of strength, numbness      PHYSICAL EXAM:  Vital Signs Last 24 Hrs  T(C): 36.7 (09 Oct 2018 07:30), Max: 36.7 (09 Oct 2018 00:00)  T(F): 98.1 (09 Oct 2018 07:30), Max: 98.1 (09 Oct 2018 00:00)  HR: 85 (09 Oct 2018 08:00) (79 - 99)  BP: 105/53 (09 Oct 2018 08:00) (105/53 - 126/75)  BP(mean): 65 (09 Oct 2018 08:00) (65 - 85)  RR: 16 (09 Oct 2018 08:00) (14 - 21)  SpO2: 97% (09 Oct 2018 08:00) (93% - 100%)    GENERAL: NAD, well-groomed, well-developed  HEAD:  Atraumatic, Normocephalic  EYES: EOMI, PERRLA, conjunctiva and sclera clear  NECK: Supple, No JVD, Normal thyroid  NERVOUS SYSTEM:  Alert & Oriented X3, Good concentration;  and symmetric  CHEST/LUNG: Clear to auscultation bilaterally; No rales, rhonchi, wheezing, or rubs  HEART: S1S2 regular, without murmur, rub nor gallop  ABDOMEN: Soft, Nontender, Nondistended; Bowel sounds present  EXTREMITIES:  2+ Peripheral Pulses, No clubbing, cyanosis, or edema      LABS:                        12.1   10.51 )-----------( 144      ( 09 Oct 2018 02:45 )             35.7     09 Oct 2018 02:45    138    |  105    |  14     ----------------------------<  138    4.3     |  21     |  1.14     Ca    7.8        09 Oct 2018 02:45  Phos  3.4       09 Oct 2018 02:45  Mg     1.9       09 Oct 2018 02:45    TPro  5.9    /  Alb  3.7    /  TBili  1.0    /  DBili  x      /  AST  27     /  ALT  14     /  AlkPhos  58     09 Oct 2018 02:45    PT/INR - ( 08 Oct 2018 15:30 )   PT: 14.3 SEC;   INR: 1.28          PTT - ( 08 Oct 2018 15:30 )  PTT:52.5 SEC  CAPILLARY BLOOD GLUCOSE          TELEMETRY: NSR     RADIOLOGY & ADDITIONAL TESTS:    Imaging Personally Reviewed:  [ ] YES         assessment:  s/p ablation    Plan:   per EP     Care Discussed with Consultants/Other Providers:

## 2018-10-15 ENCOUNTER — MESSAGE (OUTPATIENT)
Age: 73
End: 2018-10-15

## 2018-10-23 ENCOUNTER — NON-APPOINTMENT (OUTPATIENT)
Age: 73
End: 2018-10-23

## 2018-10-23 ENCOUNTER — APPOINTMENT (OUTPATIENT)
Dept: ELECTROPHYSIOLOGY | Facility: CLINIC | Age: 73
End: 2018-10-23
Payer: COMMERCIAL

## 2018-10-23 VITALS
HEART RATE: 96 BPM | BODY MASS INDEX: 25.01 KG/M2 | DIASTOLIC BLOOD PRESSURE: 86 MMHG | SYSTOLIC BLOOD PRESSURE: 132 MMHG | HEIGHT: 68 IN | WEIGHT: 165 LBS | OXYGEN SATURATION: 99 %

## 2018-10-23 PROCEDURE — 99215 OFFICE O/P EST HI 40 MIN: CPT

## 2018-10-23 PROCEDURE — 93000 ELECTROCARDIOGRAM COMPLETE: CPT

## 2018-10-23 RX ORDER — PANTOPRAZOLE 40 MG/1
40 TABLET, DELAYED RELEASE ORAL
Refills: 2 | Status: ACTIVE | COMMUNITY
Start: 2018-10-23

## 2018-10-25 ENCOUNTER — OUTPATIENT (OUTPATIENT)
Dept: OUTPATIENT SERVICES | Facility: HOSPITAL | Age: 73
LOS: 1 days | Discharge: HOME | End: 2018-10-25
Payer: COMMERCIAL

## 2018-10-25 DIAGNOSIS — K66.8 OTHER SPECIFIED DISORDERS OF PERITONEUM: Chronic | ICD-10-CM

## 2018-10-25 LAB
ALBUMIN SERPL ELPH-MCNC: 4.3 G/DL
ALP BLD-CCNC: 79 U/L
ALT SERPL-CCNC: 13 U/L
ANION GAP SERPL CALC-SCNC: 11 MMOL/L
AST SERPL-CCNC: 17 U/L
BASOPHILS # BLD AUTO: 0.02 K/UL
BASOPHILS NFR BLD AUTO: 0.4 %
BILIRUB SERPL-MCNC: 0.7 MG/DL
BUN SERPL-MCNC: 18 MG/DL
CALCIUM SERPL-MCNC: 9.5 MG/DL
CHLORIDE SERPL-SCNC: 105 MMOL/L
CO2 SERPL-SCNC: 26 MMOL/L
CREAT SERPL-MCNC: 1.41 MG/DL
EOSINOPHIL # BLD AUTO: 0.1 K/UL
EOSINOPHIL NFR BLD AUTO: 2 %
HCT VFR BLD CALC: 40.2 %
HGB BLD-MCNC: 13.2 G/DL
IMM GRANULOCYTES NFR BLD AUTO: 0.4 %
LYMPHOCYTES # BLD AUTO: 1.72 K/UL
LYMPHOCYTES NFR BLD AUTO: 34.5 %
MAN DIFF?: NORMAL
MCHC RBC-ENTMCNC: 28.3 PG
MCHC RBC-ENTMCNC: 32.8 GM/DL
MCV RBC AUTO: 86.1 FL
MONOCYTES # BLD AUTO: 0.56 K/UL
MONOCYTES NFR BLD AUTO: 11.2 %
NEUTROPHILS # BLD AUTO: 2.56 K/UL
NEUTROPHILS NFR BLD AUTO: 51.5 %
PLATELET # BLD AUTO: 226 K/UL
POTASSIUM SERPL-SCNC: 4.1 MMOL/L
PROT SERPL-MCNC: 6.9 G/DL
RBC # BLD: 4.67 M/UL
RBC # FLD: 13.3 %
SODIUM SERPL-SCNC: 142 MMOL/L
WBC # FLD AUTO: 4.98 K/UL

## 2018-10-25 PROCEDURE — 93010 ELECTROCARDIOGRAM REPORT: CPT

## 2018-10-25 PROCEDURE — 92960 CARDIOVERSION ELECTRIC EXT: CPT

## 2018-10-25 RX ORDER — SODIUM CHLORIDE 9 MG/ML
3 INJECTION INTRAMUSCULAR; INTRAVENOUS; SUBCUTANEOUS EVERY 8 HOURS
Qty: 0 | Refills: 0 | Status: DISCONTINUED | OUTPATIENT
Start: 2018-10-25 | End: 2018-11-09

## 2018-10-25 NOTE — H&P CARDIOLOGY - RS GEN PE MLT RESP DETAILS PC
breath sounds equal/normal/airway patent/respirations non-labored/clear to auscultation bilaterally/good air movement

## 2018-10-25 NOTE — CHART NOTE - NSCHARTNOTEFT_GEN_A_CORE
Type of Procedure: cardioversion  Licensed independent practitioner: Fransico Chu MD  Assistant: none  Description of procedure: cardioverted to sinus rhythm with 200 joules.   Findings of procedure: sinus rhythm post cardioversion  Estimated blood loss: none  Specimen removed: none  Preoperative Dx: atrial fibrillation, persistent  Postoperative Dx: atrial fibrillation, persistent  Complications: none  Anesthesia type: deep sedation  Continue AC    Fransico Chu MD

## 2018-10-25 NOTE — H&P CARDIOLOGY - HISTORY OF PRESENT ILLNESS
Patient is a 72 y/o man with PMHx of HLD, BPH, and persistant afib diagnosed 4 years ago s/p two failed DCCV and s/p PVI and CTI ablation sent to [facility] for cardioversion. Patient full H&P is in the chart. Patient currently sitting comfortably in bed with no new complaints, no recent illness, or CP.     ECHO 10/4/18- EF 63%, moderate to severe MR, mild to moderate AR, normal aortic root, aortic arch, and descending aorta, severely dilated left atrium, normal RV

## 2018-11-20 ENCOUNTER — APPOINTMENT (OUTPATIENT)
Dept: ELECTROPHYSIOLOGY | Facility: CLINIC | Age: 73
End: 2018-11-20
Payer: COMMERCIAL

## 2018-11-20 ENCOUNTER — NON-APPOINTMENT (OUTPATIENT)
Age: 73
End: 2018-11-20

## 2018-11-20 VITALS
HEIGHT: 68 IN | DIASTOLIC BLOOD PRESSURE: 83 MMHG | HEART RATE: 87 BPM | OXYGEN SATURATION: 100 % | SYSTOLIC BLOOD PRESSURE: 133 MMHG | WEIGHT: 172 LBS | BODY MASS INDEX: 26.07 KG/M2

## 2018-11-20 PROCEDURE — 99214 OFFICE O/P EST MOD 30 MIN: CPT

## 2018-11-20 PROCEDURE — 93000 ELECTROCARDIOGRAM COMPLETE: CPT

## 2018-11-20 RX ORDER — AMIODARONE HYDROCHLORIDE 200 MG/1
200 TABLET ORAL DAILY
Qty: 90 | Refills: 1 | Status: DISCONTINUED | COMMUNITY
Start: 2018-10-23 | End: 2018-11-20

## 2018-11-20 NOTE — PHYSICAL EXAM
[General Appearance - Well Developed] : well developed [Normal Appearance] : normal appearance [Well Groomed] : well groomed [General Appearance - Well Nourished] : well nourished [No Deformities] : no deformities [General Appearance - In No Acute Distress] : no acute distress [Normal Conjunctiva] : the conjunctiva exhibited no abnormalities [Eyelids - No Xanthelasma] : the eyelids demonstrated no xanthelasmas [Normal Oral Mucosa] : normal oral mucosa [No Oral Pallor] : no oral pallor [No Oral Cyanosis] : no oral cyanosis [Normal Jugular Venous A Waves Present] : normal jugular venous A waves present [Normal Jugular Venous V Waves Present] : normal jugular venous V waves present [No Jugular Venous Kamara A Waves] : no jugular venous kamara A waves [Respiration, Rhythm And Depth] : normal respiratory rhythm and effort [Exaggerated Use Of Accessory Muscles For Inspiration] : no accessory muscle use [Auscultation Breath Sounds / Voice Sounds] : lungs were clear to auscultation bilaterally [Heart Rate And Rhythm] : heart rate and rhythm were normal [Heart Sounds] : normal S1 and S2 [Murmurs] : no murmurs present [Abdomen Soft] : soft [Abdomen Tenderness] : non-tender [Abdomen Mass (___ Cm)] : no abdominal mass palpated [Abnormal Walk] : normal gait [Gait - Sufficient For Exercise Testing] : the gait was sufficient for exercise testing [Nail Clubbing] : no clubbing of the fingernails [Cyanosis, Localized] : no localized cyanosis [Petechial Hemorrhages (___cm)] : no petechial hemorrhages [Skin Color & Pigmentation] : normal skin color and pigmentation [] : no rash [No Venous Stasis] : no venous stasis [Skin Lesions] : no skin lesions [No Skin Ulcers] : no skin ulcer [No Xanthoma] : no  xanthoma was observed [Oriented To Time, Place, And Person] : oriented to person, place, and time [Affect] : the affect was normal [Mood] : the mood was normal [No Anxiety] : not feeling anxious

## 2018-11-22 NOTE — DISCUSSION/SUMMARY
[FreeTextEntry1] : In summary, Lev Carias is a 74y/o man with Hx of HLD, BPH, both of which are stable, and persistent afib diagnosed 4 years ago, s/p 2 failed DCCV (2016/2017), s/p PVI and CTI ablation on 10/4/2018, and most recent DCCV on 10/25/2018, maintained on Amiodarone and Eliquis, who presents today for routine f/u. Admits doing well post cardioversion with no issues or complaints. Denies chest pain, palpitations, SOB, syncope or near syncope. EKG today shows persistent atrial fibrillation. Will discontinue Amiodarone at this time and continue Eliquis 5mg BID for thromboembolic prophylaxis. Patient will continue to follow-up with Dr. Jerry. Patient has moderate to severe MR and a large LA (59 cc/m2).  Advised that if MV needs to be repaired or replaced that he undergo a MAZE procedure. \par \par Sincerely,\par \par Fransico Chu MD

## 2018-11-22 NOTE — HISTORY OF PRESENT ILLNESS
[FreeTextEntry1] : Tyler Jerry MD\par \par I saw Lev Carias on November 20, 2018. As you know, he is a 72y/o man with Hx of HLD, BPH, both of which are stable, and persistent afib diagnosed 4 years ago, s/p 2 failed DCCV (2016/2017), s/p PVI and CTI ablation on 10/4/2018, and most recent DCCV on 10/25/2018, maintained on Amiodarone and Eliquis, who presents today for routine f/u. Admits doing well post cardioversion with no issues or complaints. Denies chest pain, palpitations, SOB, syncope or near syncope. \par

## 2018-11-30 ENCOUNTER — MESSAGE (OUTPATIENT)
Age: 73
End: 2018-11-30

## 2019-07-18 NOTE — DISCHARGE NOTE ADULT - CLICK TO LAUNCH ORM
"Subjective:   Fatemeh De La O is a 34 y.o. female who presents for Abscess (Abscess inbetween legs, painful to sit and walk, pt states it feels hard, and fills up her whole hand, x4 days )              Cyst   This is a new problem. Episode onset: 4 days. The problem occurs constantly. The problem has been gradually worsening. Pertinent negatives include no fever, joint swelling, nausea or vomiting.     Review of Systems   Constitutional: Negative for fever.   Gastrointestinal: Negative for nausea and vomiting.   Musculoskeletal: Negative for joint swelling.       PMH:  has a past medical history of Allergy; Asthma; and Chronic bronchitis (HCC).  MEDS:   Current Outpatient Prescriptions:   •  sulfamethoxazole-trimethoprim (BACTRIM DS) 800-160 MG tablet, Take 1 Tab by mouth 2 times a day for 7 days., Disp: 14 Tab, Rfl: 0  •  ibuprofen (MOTRIN) 200 MG Tab, Take 400 mg by mouth every 6 hours as needed for Mild Pain., Disp: , Rfl:   ALLERGIES: No Known Allergies  SURGHX:   Past Surgical History:   Procedure Laterality Date   • DENTAL EXTRACTION(S)       SOCHX:  reports that she has been smoking Cigarettes.  She has a 3.00 pack-year smoking history. She has never used smokeless tobacco. She reports that she drinks about 1.2 - 1.8 oz of alcohol per week . She reports that she does not use drugs.  Family History   Problem Relation Age of Onset   • Diabetes Father    • Hypertension Maternal Grandmother    • Hyperlipidemia Maternal Grandmother    • Lung Disease Maternal Grandfather         asthma   • Diabetes Maternal Grandfather    • Cancer Neg Hx    • Heart Disease Neg Hx    • Stroke Neg Hx    • Alcohol/Drug Neg Hx    • Thyroid Neg Hx         Objective:   /82 (BP Location: Left arm, Patient Position: Sitting, BP Cuff Size: Adult)   Pulse (!) 104   Temp 37 °C (98.6 °F) (Temporal)   Ht 1.651 m (5' 5\")   Wt 103.9 kg (229 lb)   SpO2 97%   BMI 38.11 kg/m²     Physical Exam   Constitutional: She is oriented to " person, place, and time. She appears well-developed and well-nourished. No distress.   HENT:   Head: Normocephalic and atraumatic.   Nose: Nose normal.   Eyes: Pupils are equal, round, and reactive to light. Conjunctivae are normal.   Neck: Normal range of motion. Neck supple. No tracheal deviation present.   Cardiovascular: Normal rate and regular rhythm.    Pulmonary/Chest: Effort normal and breath sounds normal. No respiratory distress. She has no wheezes. She has no rales.   Neurological: She is alert and oriented to person, place, and time.   Skin: Skin is warm and dry. Capillary refill takes less than 2 seconds.        Psychiatric: She has a normal mood and affect. Her behavior is normal.   Vitals reviewed.        Assessment/Plan:     1. Abscess  sulfamethoxazole-trimethoprim (BACTRIM DS) 800-160 MG tablet    CULTURE WOUND W/ GRAM STAIN     PROCEDURE NOTE: INCISION AND DRAINAGE OF ABSCESS  Indications, risks, and benefits explained to patient and verbal informed consent obtained. Fluctuant area measuring 6  cm on the patients L inner thigh. Local anesthesia achieved with approx. 3 cc of 1% lidocaine with epinephrine. Area cleaned with 10% betadine solution. 3 mm incision vertical made with sterile #11 blade scalpel, loculations were probed. Copious amounts of purulent discharge expressed. The patient tolerated well. A culture was obtained. The pt will be treated empirically with bactrim. She will be notified of final culture results.     Follow-up with primary care provider within 7-10 days.  If symptoms worsen or persist patient can return to clinic for reevaluation.  Red flags and emergency room precautions discussed. All side effects of medication discussed including allergic response, GI upset, tendon injury, etc. Patient verbalized understanding of information.    Please note that this dictation was created using voice recognition software. I have made every reasonable attempt to correct obvious errors,  but I expect that there are errors of grammar and possibly content that I did not discover before finalizing the note.        .

## 2019-10-16 ENCOUNTER — RX RENEWAL (OUTPATIENT)
Age: 74
End: 2019-10-16

## 2019-10-17 ENCOUNTER — CHART COPY (OUTPATIENT)
Age: 74
End: 2019-10-17

## 2020-10-14 ENCOUNTER — RX RENEWAL (OUTPATIENT)
Age: 75
End: 2020-10-14

## 2020-10-19 ENCOUNTER — RX RENEWAL (OUTPATIENT)
Age: 75
End: 2020-10-19

## 2021-10-18 ENCOUNTER — NON-APPOINTMENT (OUTPATIENT)
Age: 76
End: 2021-10-18

## 2021-10-24 ENCOUNTER — RX RENEWAL (OUTPATIENT)
Age: 76
End: 2021-10-24

## 2021-10-29 ENCOUNTER — APPOINTMENT (OUTPATIENT)
Dept: ELECTROPHYSIOLOGY | Facility: CLINIC | Age: 76
End: 2021-10-29
Payer: MEDICARE

## 2021-10-29 ENCOUNTER — NON-APPOINTMENT (OUTPATIENT)
Age: 76
End: 2021-10-29

## 2021-10-29 VITALS
SYSTOLIC BLOOD PRESSURE: 101 MMHG | HEART RATE: 76 BPM | BODY MASS INDEX: 25.76 KG/M2 | WEIGHT: 170 LBS | TEMPERATURE: 98 F | HEIGHT: 68 IN | DIASTOLIC BLOOD PRESSURE: 68 MMHG | RESPIRATION RATE: 16 BRPM | OXYGEN SATURATION: 100 %

## 2021-10-29 PROCEDURE — 93000 ELECTROCARDIOGRAM COMPLETE: CPT

## 2021-10-29 PROCEDURE — 99214 OFFICE O/P EST MOD 30 MIN: CPT

## 2021-10-29 RX ORDER — METOPROLOL SUCCINATE 25 MG/1
25 TABLET, EXTENDED RELEASE ORAL DAILY
Refills: 0 | Status: ACTIVE | COMMUNITY
Start: 2021-10-29

## 2021-10-29 NOTE — DISCUSSION/SUMMARY
[FreeTextEntry1] : In summary Lev Carias is a 77y/o man with Hx of HLD, BPH, both of which are stable, and persistent afib diagnosed 6 years ago, s/p 2 failed DCCV (2016/2017), s/p PVI and CTI ablation on 10/4/2018, and most recent DCCV on 10/25/2018, maintained on Eliquis, who is here for opinion regarding repeat ablation; he wants to get off some of the medications he is taking. Echo one year ago with Dr. Jerry: mild to moderate MR and EF of 50%. Has MVP. \par \par 1. Persistent atrial fibrillation: The risks, benefits and alternatives of atrial fibrillation ablation were discussed with patient including, but not limited to the risk of stroke, atrial esophageal fistula, pulmonary vein stenosis, bleeding requiring transfusion, death, other arrhythmias, phrenic nerve palsy, organ damage, perforation of vessel or heart, pulmonary embolus, pneumonia, and anesthesia risks. Continue Eliquis and metoprolol for now. \par \par \par Sincerely,\par \par Fransico Chu MD

## 2021-10-29 NOTE — HISTORY OF PRESENT ILLNESS
[FreeTextEntry1] : Tyler Jerry MD\par \par I saw Lev Carias on October 29, 2021. As you know, he is a 77y/o man with Hx of HLD, BPH, both of which are stable, and persistent afib diagnosed 4 years ago, s/p 2 failed DCCV (2016/2017), s/p PVI and CTI ablation on 10/4/2018, and most recent DCCV on 10/25/2018, maintained on Amiodarone and Eliquis, who presents today for routine f/u. Admits doing well post cardioversion with no issues or complaints. Denies chest pain, palpitations, SOB, syncope or near syncope. \par \par Patient has a strong desire to undergo ablation to pare down on his medications.  Had urinary retention after previous procedure. He has not had chest pain, dyspnea, palpitations, lightheadedness, syncope, near syncope.  Sometimes gets tired.  Playing tennis.  Echo Dr. Jerry: mild to moderate MR and EF of 50%. Has MVP. \par

## 2021-10-29 NOTE — PHYSICAL EXAM
[Well Developed] : well developed [Well Nourished] : well nourished [No Acute Distress] : no acute distress [Normal Venous Pressure] : normal venous pressure [No Carotid Bruit] : no carotid bruit [Normal S1, S2] : normal S1, S2 [No Murmur] : no murmur [No Rub] : no rub [No Gallop] : no gallop [Clear Lung Fields] : clear lung fields [Good Air Entry] : good air entry [No Respiratory Distress] : no respiratory distress  [Soft] : abdomen soft [Non Tender] : non-tender [No Masses/organomegaly] : no masses/organomegaly [Normal Bowel Sounds] : normal bowel sounds [Normal Gait] : normal gait [No Edema] : no edema [No Cyanosis] : no cyanosis [No Clubbing] : no clubbing [No Varicosities] : no varicosities [No Rash] : no rash [No Skin Lesions] : no skin lesions [Moves all extremities] : moves all extremities [No Focal Deficits] : no focal deficits [Normal Speech] : normal speech [Alert and Oriented] : alert and oriented [Normal memory] : normal memory [General Appearance - Well Developed] : well developed [Normal Appearance] : normal appearance [Well Groomed] : well groomed [General Appearance - Well Nourished] : well nourished [No Deformities] : no deformities [General Appearance - In No Acute Distress] : no acute distress [Normal Conjunctiva] : the conjunctiva exhibited no abnormalities [Eyelids - No Xanthelasma] : the eyelids demonstrated no xanthelasmas [Normal Oral Mucosa] : normal oral mucosa [No Oral Pallor] : no oral pallor [No Oral Cyanosis] : no oral cyanosis [Normal Jugular Venous A Waves Present] : normal jugular venous A waves present [Normal Jugular Venous V Waves Present] : normal jugular venous V waves present [No Jugular Venous Kamara A Waves] : no jugular venous kamara A waves [Respiration, Rhythm And Depth] : normal respiratory rhythm and effort [Exaggerated Use Of Accessory Muscles For Inspiration] : no accessory muscle use [Auscultation Breath Sounds / Voice Sounds] : lungs were clear to auscultation bilaterally [Heart Rate And Rhythm] : heart rate and rhythm were normal [Heart Sounds] : normal S1 and S2 [Murmurs] : no murmurs present [Abdomen Soft] : soft [Abdomen Tenderness] : non-tender [Abdomen Mass (___ Cm)] : no abdominal mass palpated [Abnormal Walk] : normal gait [Gait - Sufficient For Exercise Testing] : the gait was sufficient for exercise testing [Nail Clubbing] : no clubbing of the fingernails [Cyanosis, Localized] : no localized cyanosis [Petechial Hemorrhages (___cm)] : no petechial hemorrhages [Skin Color & Pigmentation] : normal skin color and pigmentation [] : no rash [No Venous Stasis] : no venous stasis [Skin Lesions] : no skin lesions [No Skin Ulcers] : no skin ulcer [No Xanthoma] : no  xanthoma was observed [Oriented To Time, Place, And Person] : oriented to person, place, and time [Affect] : the affect was normal [Mood] : the mood was normal [No Anxiety] : not feeling anxious

## 2021-11-26 ENCOUNTER — EMERGENCY (EMERGENCY)
Facility: HOSPITAL | Age: 76
LOS: 1 days | Discharge: HOME | End: 2021-11-26
Attending: EMERGENCY MEDICINE | Admitting: EMERGENCY MEDICINE
Payer: MEDICARE

## 2021-11-26 VITALS
RESPIRATION RATE: 17 BRPM | HEART RATE: 111 BPM | OXYGEN SATURATION: 96 % | SYSTOLIC BLOOD PRESSURE: 108 MMHG | DIASTOLIC BLOOD PRESSURE: 52 MMHG | HEIGHT: 69 IN | TEMPERATURE: 99 F

## 2021-11-26 DIAGNOSIS — K66.8 OTHER SPECIFIED DISORDERS OF PERITONEUM: Chronic | ICD-10-CM

## 2021-11-26 PROCEDURE — 99284 EMERGENCY DEPT VISIT MOD MDM: CPT | Mod: 25

## 2021-11-26 PROCEDURE — 93010 ELECTROCARDIOGRAM REPORT: CPT

## 2021-11-26 NOTE — ED ADULT TRIAGE NOTE - CHIEF COMPLAINT QUOTE
pt presents to ED for evaluation possible syncope with fall, pt recently returned from [place] x 2 days ago has been experiencing n+v diarrhea unsure of mechanism of fall states he may have slipped on floor on vomit but is unsure, unknown head trauma, +LOC on Eliquis. pt arrives alert in nad denies any complaints at this time.

## 2021-11-27 VITALS
TEMPERATURE: 99 F | RESPIRATION RATE: 21 BRPM | DIASTOLIC BLOOD PRESSURE: 67 MMHG | SYSTOLIC BLOOD PRESSURE: 106 MMHG | OXYGEN SATURATION: 96 % | HEART RATE: 94 BPM

## 2021-11-27 LAB
ALBUMIN SERPL ELPH-MCNC: 4 G/DL — SIGNIFICANT CHANGE UP (ref 3.3–5)
ALP SERPL-CCNC: 67 U/L — SIGNIFICANT CHANGE UP (ref 40–120)
ALT FLD-CCNC: 13 U/L — SIGNIFICANT CHANGE UP (ref 4–41)
ANION GAP SERPL CALC-SCNC: 13 MMOL/L — SIGNIFICANT CHANGE UP (ref 7–14)
AST SERPL-CCNC: 26 U/L — SIGNIFICANT CHANGE UP (ref 4–40)
BASOPHILS # BLD AUTO: 0 K/UL — SIGNIFICANT CHANGE UP (ref 0–0.2)
BASOPHILS NFR BLD AUTO: 0 % — SIGNIFICANT CHANGE UP (ref 0–2)
BILIRUB SERPL-MCNC: 1.6 MG/DL — HIGH (ref 0.2–1.2)
BUN SERPL-MCNC: 19 MG/DL — SIGNIFICANT CHANGE UP (ref 7–23)
CALCIUM SERPL-MCNC: 8.5 MG/DL — SIGNIFICANT CHANGE UP (ref 8.4–10.5)
CHLORIDE SERPL-SCNC: 102 MMOL/L — SIGNIFICANT CHANGE UP (ref 98–107)
CO2 SERPL-SCNC: 20 MMOL/L — LOW (ref 22–31)
CREAT SERPL-MCNC: 1.21 MG/DL — SIGNIFICANT CHANGE UP (ref 0.5–1.3)
EOSINOPHIL # BLD AUTO: 0 K/UL — SIGNIFICANT CHANGE UP (ref 0–0.5)
EOSINOPHIL NFR BLD AUTO: 0 % — SIGNIFICANT CHANGE UP (ref 0–6)
GIANT PLATELETS BLD QL SMEAR: PRESENT — SIGNIFICANT CHANGE UP
GLUCOSE SERPL-MCNC: 121 MG/DL — HIGH (ref 70–99)
HCT VFR BLD CALC: 40.4 % — SIGNIFICANT CHANGE UP (ref 39–50)
HGB BLD-MCNC: 13.1 G/DL — SIGNIFICANT CHANGE UP (ref 13–17)
IANC: 9.27 K/UL — HIGH (ref 1.5–8.5)
LIDOCAIN IGE QN: 15 U/L — SIGNIFICANT CHANGE UP (ref 7–60)
LYMPHOCYTES # BLD AUTO: 0.29 K/UL — LOW (ref 1–3.3)
LYMPHOCYTES # BLD AUTO: 2.6 % — LOW (ref 13–44)
MANUAL SMEAR VERIFICATION: SIGNIFICANT CHANGE UP
MCHC RBC-ENTMCNC: 27.1 PG — SIGNIFICANT CHANGE UP (ref 27–34)
MCHC RBC-ENTMCNC: 32.4 GM/DL — SIGNIFICANT CHANGE UP (ref 32–36)
MCV RBC AUTO: 83.6 FL — SIGNIFICANT CHANGE UP (ref 80–100)
MONOCYTES # BLD AUTO: 0.89 K/UL — SIGNIFICANT CHANGE UP (ref 0–0.9)
MONOCYTES NFR BLD AUTO: 7.9 % — SIGNIFICANT CHANGE UP (ref 2–14)
NEUTROPHILS # BLD AUTO: 10.02 K/UL — HIGH (ref 1.8–7.4)
NEUTROPHILS NFR BLD AUTO: 86.8 % — HIGH (ref 43–77)
NEUTS BAND # BLD: 1.8 % — SIGNIFICANT CHANGE UP (ref 0–6)
PLAT MORPH BLD: NORMAL — SIGNIFICANT CHANGE UP
PLATELET # BLD AUTO: 129 K/UL — LOW (ref 150–400)
PLATELET COUNT - ESTIMATE: ABNORMAL
POTASSIUM SERPL-MCNC: 3.5 MMOL/L — SIGNIFICANT CHANGE UP (ref 3.5–5.3)
POTASSIUM SERPL-SCNC: 3.5 MMOL/L — SIGNIFICANT CHANGE UP (ref 3.5–5.3)
PROT SERPL-MCNC: 6.6 G/DL — SIGNIFICANT CHANGE UP (ref 6–8.3)
RBC # BLD: 4.83 M/UL — SIGNIFICANT CHANGE UP (ref 4.2–5.8)
RBC # FLD: 13.6 % — SIGNIFICANT CHANGE UP (ref 10.3–14.5)
RBC BLD AUTO: NORMAL — SIGNIFICANT CHANGE UP
SODIUM SERPL-SCNC: 135 MMOL/L — SIGNIFICANT CHANGE UP (ref 135–145)
VARIANT LYMPHS # BLD: 0.9 % — SIGNIFICANT CHANGE UP (ref 0–6)
WBC # BLD: 11.31 K/UL — HIGH (ref 3.8–10.5)
WBC # FLD AUTO: 11.31 K/UL — HIGH (ref 3.8–10.5)

## 2021-11-27 PROCEDURE — 70450 CT HEAD/BRAIN W/O DYE: CPT | Mod: 26,MA

## 2021-11-27 RX ORDER — SODIUM CHLORIDE 9 MG/ML
500 INJECTION INTRAMUSCULAR; INTRAVENOUS; SUBCUTANEOUS ONCE
Refills: 0 | Status: DISCONTINUED | OUTPATIENT
Start: 2021-11-27 | End: 2021-11-27

## 2021-11-27 RX ORDER — SODIUM CHLORIDE 9 MG/ML
500 INJECTION INTRAMUSCULAR; INTRAVENOUS; SUBCUTANEOUS ONCE
Refills: 0 | Status: COMPLETED | OUTPATIENT
Start: 2021-11-27 | End: 2021-11-27

## 2021-11-27 RX ADMIN — SODIUM CHLORIDE 500 MILLILITER(S): 9 INJECTION INTRAMUSCULAR; INTRAVENOUS; SUBCUTANEOUS at 01:25

## 2021-11-27 NOTE — ED PROVIDER NOTE - OBJECTIVE STATEMENT
76M w/ PMHx of afib on eliquis, HLD, BPH p/w fall and head trauma.  Pt. and wife returned from [place] on Wednesday morning.  Pt. started having diarrhea and vomiting last night.  Denies any f/c, sick contacts, CP, SOB, abd pain.  Did not eat anything out of the ordinary.  Pt. was in the bathroom vomiting when he slipped on his vomit.  +head trauma and questionable LOC.  Pt. is on eliquis.  No confusion or amnestic episodes after.

## 2021-11-27 NOTE — ED PROVIDER NOTE - ATTENDING CONTRIBUTION TO CARE
77yo M with PMHX Afib on eliquis, HLD, BPH, no prior abdominal surgeries, p/w unwitnessed fall with ?syncope and ?head trauma in setting of multiple episodes of nonbloody vomiting episodes and watery nonbloody stools x 1 day. Of note, patient recently returned from [place] 2 days ago where young children family members also with n/v/d and now wife of patient having diarrhea.  Pt says he was having copious diarrhea, some got on floor and he believes he maybe slipped on wet stool on floor, wife heard a 'thump' and found patient awake lying on abdomen on floor struggling to stand up due to weakness.  No focal weakness. No abd or chest pain or fevers. Now feeling much better per patient after all vomiting and diarrhea patient states    General: Elderly Patient alert in no apparent distress  Skin: Dry and intact  HEENT: Head atraumatic. Oral mucosa dry  Eyes: Conjunctiva normal  Cardiac: Irregularly irregular rhythm and +tachycardia. No pretibial edema b/l  Respiratory: Lungs clear b/l and symmetric. No respiratory distress. Able to speak in complete sentences.  Gastrointestinal: Abdomen soft, nondistended, nontender  Musculoskeletal: Moves all extremities spontaneously  Neurological: alert and oriented to person, place, and time. able to walk and stand without dizziness  Psychiatric: Calm and cooperative      EKG Afib rate 106bpm    a/p  likely dehydrated with viral gastroenteritis with ?syncope  will give ivf, labs to check lytes  CTH as pt on anticoagulation with fall

## 2021-11-27 NOTE — ED PROVIDER NOTE - PATIENT PORTAL LINK FT
You can access the FollowMyHealth Patient Portal offered by [facility] by registering at the following website: [email or web address]. By joining [facility]’s FollowMyHealth portal, you will also be able to view your health information using other applications (apps) compatible with our system.

## 2021-11-27 NOTE — ED PROVIDER NOTE - DATE/TIME 1

## 2021-11-27 NOTE — ED PROVIDER NOTE - NSFOLLOWUPINSTRUCTIONS_ED_ALL_ED_FT
You were seen in the Emergency Room for vomiting, diarrhea, and a fall at home, and evaluated for any life-threatening conditions.     After our evaluation, we have determined you can be discharged to go home.  Please stay hydrated at home with electrolyte solutions, broths, crackers, and bland foods.    Please return to the Emergency Room if your symptoms change or worsen    Please follow up with a general medicine doctor (PMD) routinely.     If you need help making an appointment with a doctor or do not have your own PMD, you can call our referral line at [phone] to make an appointment with a general medicine doctor (PMD).

## 2021-11-27 NOTE — ED ADULT NURSE REASSESSMENT NOTE - NS ED NURSE REASSESS COMMENT FT1
Break RN: Pt at baseline, discharged at this time with family at bedside. pt to talk to SW for assistance with transportation home, appears in NAD, will continue to monitor.
Patient is resting comfortably in bed at this time, states that he feels much better after fluids. No complaints of chest pain, headache, nausea, dizziness, vomiting  SOB, fever, chills verbalized. AFib on cardiac monitor. Awaiting CT results at this time.

## 2021-11-27 NOTE — ED PROVIDER NOTE - NSICDXPASTMEDICALHX_GEN_ALL_CORE_FT
PAST MEDICAL HISTORY:  Afib on Eliquis    BPH (benign prostatic hyperplasia)     Hyperlipidemia

## 2021-11-27 NOTE — ED PROVIDER NOTE - PHYSICAL EXAMINATION
GENERAL: Patient awake alert, appears lethargic.  HEENT: NC/AT, Moist mucous membranes, PERRL, no rashid sign/raccoon eyes.  LUNGS: CTAB, no wheezes or crackles.  CARDIAC: tachycardia, no m/r/g.    ABDOMEN: Soft, NT, ND, No rebound, guarding.  EXT: No edema. No calf tenderness. CV 2+DP/PT bilaterally.  MSK: No pain with movement, no deformities.  NEURO: A&Ox3. Moving all extremities. Motor strength 5/5 in all four extremities, sensation intact. CN 2-12 grossly intact.  SKIN: Warm and dry. No rash.  PSYCH: Normal affect.

## 2021-11-27 NOTE — PROVIDER CONTACT NOTE (OTHER) - BACKGROUND
SW assistant offered with arrangement of taxi as pt and spouse are observers of holiday. Writer arranged self pay taxi for pt and spouse to travel home with [employer] booking #[phone].

## 2021-11-27 NOTE — ED ADULT NURSE NOTE - NSIMPLEMENTINTERV_GEN_ALL_ED
Implemented All Fall with Harm Risk Interventions:  [place] to call system. Call bell, personal items and telephone within reach. Instruct patient to call for assistance. Room bathroom lighting operational. Non-slip footwear when patient is off stretcher. Physically safe environment: no spills, clutter or unnecessary equipment. Stretcher in lowest position, wheels locked, appropriate side rails in place. Provide visual cue, wrist band, yellow gown, etc. Monitor gait and stability. Monitor for mental status changes and reorient to person, place, and time. Review medications for side effects contributing to fall risk. Reinforce activity limits and safety measures with patient and family. Provide visual clues: red socks.

## 2021-11-27 NOTE — ED PROVIDER NOTE - CLINICAL SUMMARY MEDICAL DECISION MAKING FREE TEXT BOX
76M p/w head trauma, and questionable LOC after slipping on his vomit.  Pt. has had n/v, diarrhea since last night.  No f/c, sick contacts.  Low concern for syncope given LOC happened after fall, but given age and risk factors, will obtain labs.  Will obtain CTh given pt. is on AC.  Will administer fluids, reassess, and dispo pending results.

## 2021-11-27 NOTE — ED PROVIDER NOTE - PROGRESS NOTE DETAILS
Dr. Mega Davenport DO (ED ATTENDING):  patient continues to feel well in ED. tachycardia resolved. able to tolerate po intake here . All test results have been reviewed with the patient with verbalized understanding.  Opportunities to ask questions for further understanding have been offered to the patient.  The patient feels comfortable going home after our evaluation and understands to return to the Emergency Department for any new or worsening symptoms.

## 2021-11-27 NOTE — ED ADULT NURSE NOTE - OBJECTIVE STATEMENT
patient presents to trauma A, s/p fall from home. Unknown head trauma and LOC. Patient states that he had multiple episodes of non bloody Vomiting and diarrhea since 9PM tonight and thinks he slipped on his vomit in the bathroom, but is unsure. History of afib, on blood thinners currently. Patient states that he recently traveled to [facility] and has been around young children who has also been experiencing n/v/d. A&O4, ambulatory at baseline. Respirations even and unlabored. Lung sounds clear with equal chest rise bilaterally, speaking in clear sentences. ABD is soft, warm, non tender, non distended with normal active bowel sounds. Skin is warm and intact, no active bleeding or bruising noted to Upper and lower extremity at this time. No complaints of chest pain, headache, nausea, dizziness, vomiting  SOB, fever, chills verbalized.  20G IV placed, labs sent, awaiting CT and lab results patient presents to trauma A, s/p fall from home. Unknown head trauma and LOC. Patient states that he had multiple episodes of non bloody Vomiting and diarrhea since 9PM tonight and thinks he slipped on his vomit in the bathroom, but is unsure. History of afib, on blood thinners currently. Patient states that he recently traveled to [place] and has been around young children who has also been experiencing n/v/d. A&O4, ambulatory at baseline. No facial droop or slurred speech noted. Pt has equal strength, motor, and sensation to bilateral upper and lower extremities. No drifts noted to bilateral upper and lower extremities. Respirations even and unlabored. Lung sounds clear with equal chest rise bilaterally, speaking in clear sentences. ABD is soft, warm, non tender, non distended with normal active bowel sounds. Skin is warm and intact, no active bleeding or bruising noted to Upper/lower extremity and head at this time. No complaints of chest pain, headache, nausea, dizziness, vomiting, cough  SOB, fever, chills verbalized.  20G IV placed, labs sent, awaiting CT and lab results patient presents to trauma A, s/p fall from home. Unknown head trauma and LOC. Patient states that he had multiple episodes of non bloody Vomiting and diarrhea since 9PM tonight and thinks he slipped on his vomit in the bathroom, but is unsure. History of afib, on blood thinners currently. Patient states that he recently traveled to [facility] and has been around young children who has also been experiencing n/v/d. A&O4, ambulatory at baseline. No facial droop or slurred speech noted. Pt has equal strength, motor, and sensation to bilateral upper and lower extremities. No drifts noted to bilateral upper and lower extremities. Respirations even and unlabored. Lung sounds clear with equal chest rise bilaterally, speaking in clear sentences. ABD is soft, warm, non tender, non distended with normal active bowel sounds. Skin is warm and intact, no active bleeding or bruising noted to Upper/lower extremity and head at this time. No complaints of chest pain, headache, nausea, dizziness, vomiting, cough  SOB, fever, chills verbalized. Afib on cardiac monitor, 20G IV placed, labs sent, awaiting CT and lab results

## 2022-01-07 ENCOUNTER — APPOINTMENT (OUTPATIENT)
Dept: ELECTROPHYSIOLOGY | Facility: CLINIC | Age: 77
End: 2022-01-07

## 2022-05-20 ENCOUNTER — APPOINTMENT (OUTPATIENT)
Dept: ELECTROPHYSIOLOGY | Facility: CLINIC | Age: 77
End: 2022-05-20
Payer: MEDICARE

## 2022-05-20 ENCOUNTER — NON-APPOINTMENT (OUTPATIENT)
Age: 77
End: 2022-05-20

## 2022-05-20 VITALS
HEIGHT: 68 IN | WEIGHT: 167 LBS | OXYGEN SATURATION: 97 % | BODY MASS INDEX: 25.31 KG/M2 | DIASTOLIC BLOOD PRESSURE: 72 MMHG | HEART RATE: 71 BPM | SYSTOLIC BLOOD PRESSURE: 112 MMHG

## 2022-05-20 DIAGNOSIS — Z79.01 LONG TERM (CURRENT) USE OF ANTICOAGULANTS: ICD-10-CM

## 2022-05-20 PROCEDURE — 93000 ELECTROCARDIOGRAM COMPLETE: CPT

## 2022-05-20 PROCEDURE — 99213 OFFICE O/P EST LOW 20 MIN: CPT

## 2022-05-20 NOTE — DISCUSSION/SUMMARY
[FreeTextEntry1] : Impression:\par \par 1. Persistent afib: s/p PVI ablation in 2018 and multiple DCCV. EKG performed today to assess for presence of recurrent afib and reveals afib.\par Consider repeat ablation and amiodarone initiation. Patient to see urologist about post procedure urinary retention avoidance. Will call to schedule ablation. \par \par 2. HLD: resume statin therapy as prescribed and regular f/u with Cardiologist for routine lipid monitoring and management.\par \par 3. Mod-Severe MR: mod-severe MR noted on ECHO in 2018. Had repeat ECHO with Dr. Jerry, unsure of status. Possible improved MR in NSR, unknown given persistent afib since 2018. \par \par Sincerely,\par \par Fransico Chu MD

## 2022-05-20 NOTE — CARDIOLOGY SUMMARY
[de-identified] : 10/4/2018, 1. Bileaflet mitral valve prolapse. Moderate-severe mitralregurgitation. Two jets of MR are noted - one is centralin origin and direction; the other is highly eccentric andposteriorly directed. Blunting of systolic pulmonaryvenous flow is noted.2. Calcified trileaflet aortic valve with normal opening.Mild-moderate aortic regurgitation. Vena contracta widthabout 0.3 cm.3. Normal aortic root, aortic arch, and descending thoracicaorta.4. Severely dilated left atrium. LA volume index = 59cc/m2. No left atrium or left atrial appendage thrombus.5. Normal left ventricular internal dimensions and wallthicknesses.6. Normal left ventricular systolic function. No segmentalwall motion abnormalities.7. Normal right ventricular size and function.8. Contrast injection demonstrates no evidence of a patentforamen ovale. LVEF 63%.

## 2022-05-20 NOTE — PHYSICAL EXAM
[Well Developed] : well developed [Well Nourished] : well nourished [No Acute Distress] : no acute distress [Normal Conjunctiva] : normal conjunctiva [Normal Venous Pressure] : normal venous pressure [No Carotid Bruit] : no carotid bruit [Normal S1, S2] : normal S1, S2 [No Murmur] : no murmur [No Rub] : no rub [No Gallop] : no gallop [Irregularly Irregular] : irregularly irregular [Clear Lung Fields] : clear lung fields [Good Air Entry] : good air entry [No Respiratory Distress] : no respiratory distress  [Soft] : abdomen soft [Non Tender] : non-tender [No Masses/organomegaly] : no masses/organomegaly [Normal Bowel Sounds] : normal bowel sounds [Normal Gait] : normal gait [No Edema] : no edema [No Cyanosis] : no cyanosis [No Clubbing] : no clubbing [No Varicosities] : no varicosities [No Rash] : no rash [No Skin Lesions] : no skin lesions [Moves all extremities] : moves all extremities [No Focal Deficits] : no focal deficits [Normal Speech] : normal speech [Alert and Oriented] : alert and oriented [Normal memory] : normal memory

## 2022-05-20 NOTE — HISTORY OF PRESENT ILLNESS
[FreeTextEntry1] : Lev Carias is a 77y/o man with Hx of HLD, BPH, mod-severe MR, and persistent afib s/p 2 failed DCCV (2016/2017), s/p PVI and CTI ablation on 10/4/2018, and DCCV on 10/25/2018 who presents today for routine f/u. Has remained in afib since last visit. Has been feeling well but wishes to come off AC in future and curious about repeat procedures. Plays tennis with minimal symptoms although has not played recently. Of note, prior ECHO in 2018 with mod-severe MR in setting of afib. Denies chest pain, palpitations, SOB, syncope or near syncope. Patient has weakness in his legs when he walks up an incline which could be a sign of decreased cardiac output from AF.  He also wants to see urologist about urinary issues post AF ablation. \par

## 2022-06-15 ENCOUNTER — OUTPATIENT (OUTPATIENT)
Dept: OUTPATIENT SERVICES | Facility: HOSPITAL | Age: 77
LOS: 1 days | End: 2022-06-15

## 2022-06-15 VITALS
HEIGHT: 68 IN | DIASTOLIC BLOOD PRESSURE: 71 MMHG | TEMPERATURE: 97 F | RESPIRATION RATE: 14 BRPM | SYSTOLIC BLOOD PRESSURE: 109 MMHG | OXYGEN SATURATION: 97 % | WEIGHT: 160.06 LBS | HEART RATE: 81 BPM

## 2022-06-15 DIAGNOSIS — Z98.890 OTHER SPECIFIED POSTPROCEDURAL STATES: Chronic | ICD-10-CM

## 2022-06-15 DIAGNOSIS — I48.19 OTHER PERSISTENT ATRIAL FIBRILLATION: ICD-10-CM

## 2022-06-15 DIAGNOSIS — G47.33 OBSTRUCTIVE SLEEP APNEA (ADULT) (PEDIATRIC): ICD-10-CM

## 2022-06-15 DIAGNOSIS — I48.91 UNSPECIFIED ATRIAL FIBRILLATION: ICD-10-CM

## 2022-06-15 DIAGNOSIS — K66.8 OTHER SPECIFIED DISORDERS OF PERITONEUM: Chronic | ICD-10-CM

## 2022-06-15 LAB
ALBUMIN SERPL ELPH-MCNC: 4.4 G/DL — SIGNIFICANT CHANGE UP (ref 3.3–5)
ALP SERPL-CCNC: 80 U/L — SIGNIFICANT CHANGE UP (ref 40–120)
ALT FLD-CCNC: 22 U/L — SIGNIFICANT CHANGE UP (ref 4–41)
ANION GAP SERPL CALC-SCNC: 11 MMOL/L — SIGNIFICANT CHANGE UP (ref 7–14)
AST SERPL-CCNC: 21 U/L — SIGNIFICANT CHANGE UP (ref 4–40)
BILIRUB SERPL-MCNC: 0.9 MG/DL — SIGNIFICANT CHANGE UP (ref 0.2–1.2)
BLD GP AB SCN SERPL QL: NEGATIVE — SIGNIFICANT CHANGE UP
BUN SERPL-MCNC: 22 MG/DL — SIGNIFICANT CHANGE UP (ref 7–23)
CALCIUM SERPL-MCNC: 9.3 MG/DL — SIGNIFICANT CHANGE UP (ref 8.4–10.5)
CHLORIDE SERPL-SCNC: 105 MMOL/L — SIGNIFICANT CHANGE UP (ref 98–107)
CO2 SERPL-SCNC: 23 MMOL/L — SIGNIFICANT CHANGE UP (ref 22–31)
CREAT SERPL-MCNC: 1.42 MG/DL — HIGH (ref 0.5–1.3)
EGFR: 52 ML/MIN/1.73M2 — LOW
GLUCOSE SERPL-MCNC: 118 MG/DL — HIGH (ref 70–99)
HCT VFR BLD CALC: 41.8 % — SIGNIFICANT CHANGE UP (ref 39–50)
HGB BLD-MCNC: 13.5 G/DL — SIGNIFICANT CHANGE UP (ref 13–17)
MCHC RBC-ENTMCNC: 28.1 PG — SIGNIFICANT CHANGE UP (ref 27–34)
MCHC RBC-ENTMCNC: 32.3 GM/DL — SIGNIFICANT CHANGE UP (ref 32–36)
MCV RBC AUTO: 86.9 FL — SIGNIFICANT CHANGE UP (ref 80–100)
NRBC # BLD: 0 /100 WBCS — SIGNIFICANT CHANGE UP
NRBC # FLD: 0 K/UL — SIGNIFICANT CHANGE UP
PLATELET # BLD AUTO: 152 K/UL — SIGNIFICANT CHANGE UP (ref 150–400)
POTASSIUM SERPL-MCNC: 4.1 MMOL/L — SIGNIFICANT CHANGE UP (ref 3.5–5.3)
POTASSIUM SERPL-SCNC: 4.1 MMOL/L — SIGNIFICANT CHANGE UP (ref 3.5–5.3)
PROT SERPL-MCNC: 7.1 G/DL — SIGNIFICANT CHANGE UP (ref 6–8.3)
RBC # BLD: 4.81 M/UL — SIGNIFICANT CHANGE UP (ref 4.2–5.8)
RBC # FLD: 13 % — SIGNIFICANT CHANGE UP (ref 10.3–14.5)
RH IG SCN BLD-IMP: POSITIVE — SIGNIFICANT CHANGE UP
SODIUM SERPL-SCNC: 139 MMOL/L — SIGNIFICANT CHANGE UP (ref 135–145)
WBC # BLD: 5.39 K/UL — SIGNIFICANT CHANGE UP (ref 3.8–10.5)
WBC # FLD AUTO: 5.39 K/UL — SIGNIFICANT CHANGE UP (ref 3.8–10.5)

## 2022-06-15 RX ORDER — AMIODARONE HYDROCHLORIDE 400 MG/1
1 TABLET ORAL
Qty: 0 | Refills: 0 | DISCHARGE

## 2022-06-15 RX ORDER — ALFUZOSIN HYDROCHLORIDE 10 MG/1
1 TABLET, EXTENDED RELEASE ORAL
Qty: 0 | Refills: 0 | DISCHARGE

## 2022-06-15 RX ORDER — ATORVASTATIN CALCIUM 80 MG/1
1 TABLET, FILM COATED ORAL
Qty: 0 | Refills: 0 | DISCHARGE

## 2022-06-15 NOTE — H&P PST ADULT - RESPIRATORY
normal/clear to auscultation bilaterally/no wheezes/no rales/no rhonchi normal/clear to auscultation bilaterally/no wheezes/no rales/no rhonchi/no respiratory distress

## 2022-06-15 NOTE — H&P PST ADULT - CARDIOVASCULAR
normal/regular rate and rhythm/S1 S2 present/no gallops/no rub/no murmur details… S1 S2 present/Irregularly irregular rhythm

## 2022-06-15 NOTE — H&P PST ADULT - PROBLEM SELECTOR PLAN 1
Pt scheduled for complex afib ablation w/biosense on 6/29/2022.  labs done results pending, ekg in chart. Pt scheduled for surgery on 6/29/22.  Pre-op instructions provided. Medication instructions per cardiology. Pt verbalized understanding.   Order placed for preop COVID PCR testing.

## 2022-06-15 NOTE — H&P PST ADULT - GASTROINTESTINAL
negative normal/soft/nontender/nondistended/normal active bowel sounds soft/nontender/nondistended/normal active bowel sounds

## 2022-06-15 NOTE — H&P PST ADULT - NSICDXPASTSURGICALHX_GEN_ALL_CORE_FT
PAST SURGICAL HISTORY:  Abdominal cyst s/p anal cyst removal    H/O cardiac radiofrequency ablation 2018     PAST SURGICAL HISTORY:  Abdominal cyst s/p anal cyst removal    H/O cardiac radiofrequency ablation 2018    History of cardioversion

## 2022-06-15 NOTE — H&P PST ADULT - NEUROLOGICAL
negative normal/cranial nerves II-XII intact/sensation intact normal/sensation intact/responds to verbal commands

## 2022-06-15 NOTE — H&P PST ADULT - HISTORY OF PRESENT ILLNESS
77y/o male scheduled for complex afib ablation with CAXAense on 6/29/2022.    Pt states, "hx of afib underwent cardiac ablation 2018, afib returned on Eliquis.

## 2022-06-15 NOTE — H&P PST ADULT - NSICDXPASTMEDICALHX_GEN_ALL_CORE_FT
PAST MEDICAL HISTORY:  Afib on Eliquis    BPH (benign prostatic hyperplasia)     Hyperlipidemia     Hypertension      PAST MEDICAL HISTORY:  Afib on Eliquis    BPH (benign prostatic hyperplasia)     Hyperlipidemia     Hypertension     Sleep apnea

## 2022-06-27 LAB — SARS-COV-2 N GENE NPH QL NAA+PROBE: NOT DETECTED

## 2022-06-29 ENCOUNTER — OUTPATIENT (OUTPATIENT)
Dept: OUTPATIENT SERVICES | Facility: HOSPITAL | Age: 77
LOS: 1 days | Discharge: HOME | End: 2022-06-29

## 2022-06-29 DIAGNOSIS — I48.19 OTHER PERSISTENT ATRIAL FIBRILLATION: ICD-10-CM

## 2022-06-29 DIAGNOSIS — Z98.890 OTHER SPECIFIED POSTPROCEDURAL STATES: Chronic | ICD-10-CM

## 2022-06-29 DIAGNOSIS — K66.8 OTHER SPECIFIED DISORDERS OF PERITONEUM: Chronic | ICD-10-CM

## 2022-06-29 PROCEDURE — 93656 COMPRE EP EVAL ABLTJ ATR FIB: CPT

## 2022-06-29 PROCEDURE — 93623 PRGRMD STIMJ&PACG IV RX NFS: CPT | Mod: 26

## 2022-06-29 PROCEDURE — 93010 ELECTROCARDIOGRAM REPORT: CPT

## 2022-06-29 PROCEDURE — 93657 TX L/R ATRIAL FIB ADDL: CPT

## 2022-06-29 RX ORDER — PANTOPRAZOLE SODIUM 20 MG/1
1 TABLET, DELAYED RELEASE ORAL
Qty: 30 | Refills: 0
Start: 2022-06-29 | End: 2022-07-28

## 2022-06-29 NOTE — CHART NOTE - NSCHARTNOTEFT_GEN_A_CORE
The patient presents today for atrial fibrillation ablation  See H&P from presurgical testing.   The patient denies any new complaints since the last time he was seen by Dr. Chu.  Medications reviewed.    NPO Date and Time: 6/28/22 at 2230  Mallampati: 1  Anticoagulation Date and Time? Eliquis 6/28/22 at 2200  Previous Endoscopy?  NO  Hx of CVA?  NO  Sleep Apnea?  YES, no CPAP  Dentures? NO  Loose Teeth? NO      Patient Denies:    Prior difficult intubation or airway problems  Odynophagia  Dysphagia  Esophageal stricture  Esophageal tumor  Esophageal varices  Esophageal perforation/laceration  Esophageal diverticulum  Large diaphragmatic Hernia  Active or recent upper gastrointestinal (GI) bleed  History of GI surgery  History of Esophageal surgery  History of Nath's esophagus  Tenuous cardiorespiratory status  Cervical spine arthritis with reduced range of motion or atlantoaxial joint disease. History of radiation to head, neck, or mediastinum  Severe thrombocytopenia The patient presents today for atrial fibrillation ablation  See H&P from presurgical testing.   The patient denies any new complaints since the last time he was seen by Dr. Chu.  Medications reviewed.  Patient admits to history of BPH at which he had difficulty urinating post-op 4years ago requiring discharge with chris catheter; patient followed up with his urologist prior to this procedure and he was advised to start taking Silodosin week prior to ablation (hold Alfuzosin) and resume Alfuzosin next week. Patient admits to following these instructions    NPO Date and Time: 6/28/22 at 2230  Mallampati: 1  Anticoagulation Date and Time? Eliquis 6/28/22 at 2200  Previous Endoscopy?  NO  Hx of CVA?  NO  Sleep Apnea?  YES, no CPAP  Dentures? NO  Loose Teeth? NO      Patient Denies:    Prior difficult intubation or airway problems  Odynophagia  Dysphagia  Esophageal stricture  Esophageal tumor  Esophageal varices  Esophageal perforation/laceration  Esophageal diverticulum  Large diaphragmatic Hernia  Active or recent upper gastrointestinal (GI) bleed  History of GI surgery  History of Esophageal surgery  History of Nath's esophagus  Tenuous cardiorespiratory status  Cervical spine arthritis with reduced range of motion or atlantoaxial joint disease. History of radiation to head, neck, or mediastinum  Severe thrombocytopenia

## 2022-08-02 ENCOUNTER — APPOINTMENT (OUTPATIENT)
Dept: ELECTROPHYSIOLOGY | Facility: CLINIC | Age: 77
End: 2022-08-02

## 2022-08-02 ENCOUNTER — NON-APPOINTMENT (OUTPATIENT)
Age: 77
End: 2022-08-02

## 2022-08-02 VITALS — WEIGHT: 167 LBS | BODY MASS INDEX: 25.39 KG/M2

## 2022-08-02 VITALS
BODY MASS INDEX: 25.39 KG/M2 | DIASTOLIC BLOOD PRESSURE: 67 MMHG | SYSTOLIC BLOOD PRESSURE: 100 MMHG | HEIGHT: 68 IN | HEART RATE: 91 BPM | TEMPERATURE: 98.6 F | OXYGEN SATURATION: 98 %

## 2022-08-02 DIAGNOSIS — I34.0 NONRHEUMATIC MITRAL (VALVE) INSUFFICIENCY: ICD-10-CM

## 2022-08-02 DIAGNOSIS — Z86.79 OTHER SPECIFIED POSTPROCEDURAL STATES: ICD-10-CM

## 2022-08-02 DIAGNOSIS — Z98.890 OTHER SPECIFIED POSTPROCEDURAL STATES: ICD-10-CM

## 2022-08-02 PROCEDURE — 93000 ELECTROCARDIOGRAM COMPLETE: CPT

## 2022-08-02 PROCEDURE — 99213 OFFICE O/P EST LOW 20 MIN: CPT

## 2022-08-02 RX ORDER — SILODOSIN 8 MG/1
8 CAPSULE ORAL
Qty: 30 | Refills: 0 | Status: DISCONTINUED | COMMUNITY
Start: 2022-05-25

## 2022-08-02 RX ORDER — SILODOSIN 8 MG/1
8 CAPSULE ORAL
Qty: 30 | Refills: 0 | Status: ACTIVE | COMMUNITY
Start: 2022-05-25

## 2022-08-02 NOTE — DISCUSSION/SUMMARY
[EKG obtained to assist in diagnosis and management of assessed problem(s)] : EKG obtained to assist in diagnosis and management of assessed problem(s) [FreeTextEntry1] : Impression:\par \par 1. Persistent afib: s/p PVI ablation in 2018 and multiple DCCV and now s/p PVI, posterior wall, and CTI ablation on 6/29/2022. EKG performed today to assess for presence of recurrent afib and reveals afib, well rate controlled. Remains on Eliquis for thromboembolic prophylaxis. Occasional lightheaded, possibly secondary to hypotension as BP on lower side and on BPH medication. Given asymptomatic of afib, resume rate control management strategies at this time. \par \par 2. HLD: resume statin therapy as prescribed and regular f/u with Cardiologist for routine lipid monitoring and management.\par \par 3. Mod-Severe MR: mod-severe MR noted on ECHO in 2018. Had repeat ECHO with Dr. Jerry, unsure of status. Possible improved MR in NSR, unknown given persistent afib since 2018. Will see Dr. Jerry for f/u and ECHO if needed. \par \par Will continue f/u with Cardiologist and may RTO as needed or if any new or worsening symptoms or findings occur.\par \par Sincerely,\par \par Fransico Chu MD

## 2022-08-02 NOTE — CARDIOLOGY SUMMARY
[de-identified] : 10/4/2018, 1. Bileaflet mitral valve prolapse. Moderate-severe mitralregurgitation. Two jets of MR are noted - one is centralin origin and direction; the other is highly eccentric andposteriorly directed. Blunting of systolic pulmonaryvenous flow is noted.2. Calcified trileaflet aortic valve with normal opening.Mild-moderate aortic regurgitation. Vena contracta widthabout 0.3 cm.3. Normal aortic root, aortic arch, and descending thoracicaorta.4. Severely dilated left atrium. LA volume index = 59cc/m2. No left atrium or left atrial appendage thrombus.5. Normal left ventricular internal dimensions and wallthicknesses.6. Normal left ventricular systolic function. No segmentalwall motion abnormalities.7. Normal right ventricular size and function.8. Contrast injection demonstrates no evidence of a patentforamen ovale. LVEF 63%.

## 2022-08-02 NOTE — HISTORY OF PRESENT ILLNESS
[FreeTextEntry1] : Lev Carias is a 75y/o man with Hx of HLD, BPH, mod-severe MR, and persistent afib s/p 2 failed DCCV (2016/2017), s/p PVI and CTI ablation on 10/4/2018, and DCCV on 10/25/2018 and now s/p PVI and posterior wall and CTI ablation on 6/29/2022 who presents today for routine f/u. Doing well post ablation. Occasional lightheaded with positional changes. Denies chest pain, palpitations, SOB, syncope or near syncope. Right groin without pain, swelling, or bruising. Remains on Eliquis for thromboembolic prophylaxis.

## 2022-09-15 PROBLEM — I10 ESSENTIAL (PRIMARY) HYPERTENSION: Chronic | Status: ACTIVE | Noted: 2022-06-15

## 2022-09-15 PROBLEM — G47.30 SLEEP APNEA, UNSPECIFIED: Chronic | Status: ACTIVE | Noted: 2022-06-15

## 2022-10-28 ENCOUNTER — OUTPATIENT (OUTPATIENT)
Dept: OUTPATIENT SERVICES | Facility: HOSPITAL | Age: 77
LOS: 1 days | End: 2022-10-28

## 2022-10-28 VITALS
SYSTOLIC BLOOD PRESSURE: 100 MMHG | TEMPERATURE: 98 F | WEIGHT: 160.94 LBS | DIASTOLIC BLOOD PRESSURE: 67 MMHG | RESPIRATION RATE: 15 BRPM | OXYGEN SATURATION: 98 % | HEIGHT: 67 IN | HEART RATE: 67 BPM

## 2022-10-28 DIAGNOSIS — Z98.890 OTHER SPECIFIED POSTPROCEDURAL STATES: Chronic | ICD-10-CM

## 2022-10-28 DIAGNOSIS — I48.19 OTHER PERSISTENT ATRIAL FIBRILLATION: ICD-10-CM

## 2022-10-28 DIAGNOSIS — K66.8 OTHER SPECIFIED DISORDERS OF PERITONEUM: Chronic | ICD-10-CM

## 2022-10-28 DIAGNOSIS — G47.33 OBSTRUCTIVE SLEEP APNEA (ADULT) (PEDIATRIC): ICD-10-CM

## 2022-10-28 DIAGNOSIS — T78.40XA ALLERGY, UNSPECIFIED, INITIAL ENCOUNTER: ICD-10-CM

## 2022-10-28 LAB
ALBUMIN SERPL ELPH-MCNC: 4.2 G/DL — SIGNIFICANT CHANGE UP (ref 3.3–5)
ALP SERPL-CCNC: 71 U/L — SIGNIFICANT CHANGE UP (ref 40–120)
ALT FLD-CCNC: 12 U/L — SIGNIFICANT CHANGE UP (ref 4–41)
ANION GAP SERPL CALC-SCNC: 9 MMOL/L — SIGNIFICANT CHANGE UP (ref 7–14)
AST SERPL-CCNC: 21 U/L — SIGNIFICANT CHANGE UP (ref 4–40)
BILIRUB SERPL-MCNC: 1.5 MG/DL — HIGH (ref 0.2–1.2)
BUN SERPL-MCNC: 20 MG/DL — SIGNIFICANT CHANGE UP (ref 7–23)
CALCIUM SERPL-MCNC: 9.6 MG/DL — SIGNIFICANT CHANGE UP (ref 8.4–10.5)
CHLORIDE SERPL-SCNC: 104 MMOL/L — SIGNIFICANT CHANGE UP (ref 98–107)
CO2 SERPL-SCNC: 26 MMOL/L — SIGNIFICANT CHANGE UP (ref 22–31)
CREAT SERPL-MCNC: 1.28 MG/DL — SIGNIFICANT CHANGE UP (ref 0.5–1.3)
EGFR: 58 ML/MIN/1.73M2 — LOW
GLUCOSE SERPL-MCNC: 109 MG/DL — HIGH (ref 70–99)
HCT VFR BLD CALC: 39.5 % — SIGNIFICANT CHANGE UP (ref 39–50)
HGB BLD-MCNC: 12.5 G/DL — LOW (ref 13–17)
MCHC RBC-ENTMCNC: 27.5 PG — SIGNIFICANT CHANGE UP (ref 27–34)
MCHC RBC-ENTMCNC: 31.6 GM/DL — LOW (ref 32–36)
MCV RBC AUTO: 87 FL — SIGNIFICANT CHANGE UP (ref 80–100)
NRBC # BLD: 0 /100 WBCS — SIGNIFICANT CHANGE UP (ref 0–0)
NRBC # FLD: 0 K/UL — SIGNIFICANT CHANGE UP (ref 0–0)
PLATELET # BLD AUTO: 144 K/UL — LOW (ref 150–400)
POTASSIUM SERPL-MCNC: 3.9 MMOL/L — SIGNIFICANT CHANGE UP (ref 3.5–5.3)
POTASSIUM SERPL-SCNC: 3.9 MMOL/L — SIGNIFICANT CHANGE UP (ref 3.5–5.3)
PROT SERPL-MCNC: 7 G/DL — SIGNIFICANT CHANGE UP (ref 6–8.3)
RBC # BLD: 4.54 M/UL — SIGNIFICANT CHANGE UP (ref 4.2–5.8)
RBC # FLD: 13.3 % — SIGNIFICANT CHANGE UP (ref 10.3–14.5)
SODIUM SERPL-SCNC: 139 MMOL/L — SIGNIFICANT CHANGE UP (ref 135–145)
WBC # BLD: 10.98 K/UL — HIGH (ref 3.8–10.5)
WBC # FLD AUTO: 10.98 K/UL — HIGH (ref 3.8–10.5)

## 2022-10-28 RX ORDER — SILODOSIN 4 MG/1
1 CAPSULE ORAL
Qty: 0 | Refills: 0 | DISCHARGE

## 2022-10-28 RX ORDER — CHOLECALCIFEROL (VITAMIN D3) 125 MCG
1 CAPSULE ORAL
Qty: 0 | Refills: 0 | DISCHARGE

## 2022-10-28 NOTE — H&P PST ADULT - RESPIRATORY
normal/clear to auscultation bilaterally/no wheezes/no rales/no rhonchi/no respiratory distress/breath sounds equal

## 2022-10-28 NOTE — H&P PST ADULT - HEMATOLOGY/LYMPHATICS
Obed Haley,    It was a pleasure to see you today at the [facility].     My recommendations after this visit include:    Reduce metoprolol to 25 mg  conner Henao MD, FACC, MILI           details…

## 2022-10-28 NOTE — H&P PST ADULT - NSICDXPASTMEDICALHX_GEN_ALL_CORE_FT
PAST MEDICAL HISTORY:  Afib on Eliquis    BPH (benign prostatic hyperplasia)     H/O hemorrhoids     Hyperlipidemia     Hypertension     Sleep apnea

## 2022-10-28 NOTE — H&P PST ADULT - MUSCULOSKELETAL
details… normal gait/strength 5/5 bilateral upper extremities/strength 5/5 bilateral lower extremities/back exam

## 2022-10-28 NOTE — H&P PST ADULT - ASSESSMENT
77 year old male with PMH HLD, BPH, persistent Afib s/p 2 failed DCCV(2016/2017), S/P Ablation in 2018/ 2022, presents to PST, with pre op diagnosis of persistent Afib, for pre op evaluation prior to schedule procedure- cardioversion with Dr Chu.

## 2022-10-28 NOTE — H&P PST ADULT - NSICDXPASTSURGICALHX_GEN_ALL_CORE_FT
PAST SURGICAL HISTORY:  Abdominal cyst s/p anal cyst removal/ 1970    H/O cardiac radiofrequency ablation 2018, 2022    History of cardioversion 2016/2017

## 2022-10-28 NOTE — H&P PST ADULT - CARDIOVASCULAR COMMENTS
pt with chronic afib- s/p failed cardioversion and ablation in past with occasional palpitations pre op diagnosis - persistent Afib

## 2022-10-28 NOTE — H&P PST ADULT - PROBLEM SELECTOR PLAN 1
Patient is tentatively scheduled for procedure- cardioversion with Dr Chu on 11/16/2022.    Pre-op instructions provided. Pt given verbal and written instructions with teach back on correspondence reports. Pt verbalized understanding with return demonstration.    Routine Covid PCR test ordered .Instructions regarding covid PCR test and locations for covid testing site provided. Pt verbalized understanding

## 2022-11-16 ENCOUNTER — OUTPATIENT (OUTPATIENT)
Dept: OUTPATIENT SERVICES | Facility: HOSPITAL | Age: 77
LOS: 1 days | Discharge: HOME | End: 2022-11-16

## 2022-11-16 DIAGNOSIS — Z98.890 OTHER SPECIFIED POSTPROCEDURAL STATES: Chronic | ICD-10-CM

## 2022-11-16 DIAGNOSIS — K66.8 OTHER SPECIFIED DISORDERS OF PERITONEUM: Chronic | ICD-10-CM

## 2022-11-16 DIAGNOSIS — I48.19 OTHER PERSISTENT ATRIAL FIBRILLATION: ICD-10-CM

## 2022-11-16 LAB — SARS-COV-2 N GENE NPH QL NAA+PROBE: NOT DETECTED

## 2022-11-16 PROCEDURE — 93653 COMPRE EP EVAL TX SVT: CPT

## 2022-11-16 PROCEDURE — 93662 INTRACARDIAC ECG (ICE): CPT | Mod: 26

## 2022-11-16 PROCEDURE — 93623 PRGRMD STIMJ&PACG IV RX NFS: CPT | Mod: 26

## 2022-11-16 PROCEDURE — 93462 L HRT CATH TRNSPTL PUNCTURE: CPT

## 2022-11-16 PROCEDURE — 93010 ELECTROCARDIOGRAM REPORT: CPT | Mod: 76

## 2022-11-16 PROCEDURE — 92960 CARDIOVERSION ELECTRIC EXT: CPT | Mod: 59

## 2022-11-16 RX ORDER — APIXABAN 2.5 MG/1
1 TABLET, FILM COATED ORAL
Qty: 0 | Refills: 0 | DISCHARGE

## 2022-11-16 RX ORDER — ALFUZOSIN HYDROCHLORIDE 10 MG/1
1 TABLET, EXTENDED RELEASE ORAL
Qty: 0 | Refills: 0 | DISCHARGE

## 2022-11-16 RX ORDER — ATORVASTATIN CALCIUM 80 MG/1
1 TABLET, FILM COATED ORAL
Qty: 0 | Refills: 0 | DISCHARGE

## 2022-11-16 RX ORDER — FINASTERIDE 5 MG/1
1 TABLET, FILM COATED ORAL
Qty: 0 | Refills: 0 | DISCHARGE

## 2022-11-16 RX ORDER — SODIUM CHLORIDE 9 MG/ML
3 INJECTION INTRAMUSCULAR; INTRAVENOUS; SUBCUTANEOUS EVERY 8 HOURS
Refills: 0 | Status: DISCONTINUED | OUTPATIENT
Start: 2022-11-16 | End: 2022-11-30

## 2022-11-16 RX ORDER — METOPROLOL TARTRATE 50 MG
0.5 TABLET ORAL
Qty: 0 | Refills: 0 | DISCHARGE

## 2022-11-16 NOTE — H&P CARDIOLOGY - HISTORY OF PRESENT ILLNESS
78 y/o M with PMH of HLD, BPH, persistent Afib(on Eliquis and 2 failed DCCV in 2016/2017 and 2 ablations in 2018/2022) presented to [facility] for DCCV. Patient stated that  76 y/o M with PMH of HLD, BPH, persistent Afib(on Eliquis and 2 failed DCCV in 2016/2017 and 2 ablations in 2018/2022) presented to [facility] for DCCV. Patient stated that he is in his usual state of health and endorsed of some intermittent palpitations and lightheadedness but denied any CP or SOB. Patient stated that after his last ablation he was in sinus rhythm only to convert back to Afib in a day. Patient otherwise denied any fevers, chills, N/V/D/C, abdominal pain, dysuria, melena, hematochezia, recent travel, sick contact, cough, body aches, pleuritic or positional chest pain.     COVID PCR not detected on 11/16/22

## 2022-11-16 NOTE — H&P CARDIOLOGY - NSALCOHOLTYPE_GEN__A_CORE_SD
severe AS and MR on previous TTE with severe pulm HTN, which is likely cause of pt's low baseline BP   will need to be gentle with IVF to prevent pulmonary edema   pressors as needed to maintain MAP>65 wine

## 2022-12-02 ENCOUNTER — NON-APPOINTMENT (OUTPATIENT)
Age: 77
End: 2022-12-02

## 2022-12-02 ENCOUNTER — APPOINTMENT (OUTPATIENT)
Dept: ELECTROPHYSIOLOGY | Facility: CLINIC | Age: 77
End: 2022-12-02

## 2022-12-02 VITALS
WEIGHT: 160 LBS | OXYGEN SATURATION: 97 % | SYSTOLIC BLOOD PRESSURE: 108 MMHG | TEMPERATURE: 98.6 F | HEIGHT: 68 IN | DIASTOLIC BLOOD PRESSURE: 67 MMHG | HEART RATE: 98 BPM | BODY MASS INDEX: 24.25 KG/M2

## 2022-12-02 DIAGNOSIS — E78.5 HYPERLIPIDEMIA, UNSPECIFIED: ICD-10-CM

## 2022-12-02 DIAGNOSIS — I48.19 OTHER PERSISTENT ATRIAL FIBRILLATION: ICD-10-CM

## 2022-12-02 PROCEDURE — 99213 OFFICE O/P EST LOW 20 MIN: CPT

## 2022-12-02 RX ORDER — ATORVASTATIN CALCIUM 20 MG/1
20 TABLET, FILM COATED ORAL DAILY
Qty: 30 | Refills: 0 | Status: DISCONTINUED | COMMUNITY
Start: 2018-09-14 | End: 2022-12-02

## 2022-12-02 RX ORDER — ATORVASTATIN CALCIUM 10 MG/1
10 TABLET, FILM COATED ORAL
Qty: 90 | Refills: 0 | Status: ACTIVE | COMMUNITY
Start: 2022-08-04

## 2022-12-02 NOTE — HISTORY OF PRESENT ILLNESS
[FreeTextEntry1] : Lev Carias is a 77y/o man with Hx of HLD, BPH, mod-severe MR, and persistent afib s/p 2 failed DCCV (2016/2017), s/p PVI and CTI ablation on 10/4/2018, and DCCV on 10/25/2018 and now s/p PVI and posterior wall and CTI ablation on 6/29/2022 and now s/p DCCV on 11/16/2022 who presents today for routine f/u. Feels fine post cardioversion. 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 Denies chest pain, palpitations, SOB, syncope or near syncope. Remains on Eliquis without s/s of bleeding.

## 2022-12-02 NOTE — CARDIOLOGY SUMMARY
[de-identified] : 10/4/2018, 1. Bileaflet mitral valve prolapse. Moderate-severe mitralregurgitation. Two jets of MR are noted - one is centralin origin and direction; the other is highly eccentric andposteriorly directed. Blunting of systolic pulmonaryvenous flow is noted.2. Calcified trileaflet aortic valve with normal opening.Mild-moderate aortic regurgitation. Vena contracta widthabout 0.3 cm.3. Normal aortic root, aortic arch, and descending thoracicaorta.4. Severely dilated left atrium. LA volume index = 59cc/m2. No left atrium or left atrial appendage thrombus.5. Normal left ventricular internal dimensions and wallthicknesses.6. Normal left ventricular systolic function. No segmentalwall motion abnormalities.7. Normal right ventricular size and function.8. Contrast injection demonstrates no evidence of a patentforamen ovale. LVEF 63%.

## 2022-12-02 NOTE — DISCUSSION/SUMMARY
[EKG obtained to assist in diagnosis and management of assessed problem(s)] : EKG obtained to assist in diagnosis and management of assessed problem(s) [FreeTextEntry1] : Impression:\par \par 1. Persistent afib: s/p PVI ablation in 2018 and multiple DCCV and now s/p PVI, posterior wall, and CTI ablation on 6/29/2022 and now s/p DCCV on 11/16/2022. EKG performed today to assess for presence of recurrent afib and reveals afib, well rate controlled. Remains on Eliquis for thromboembolic prophylaxis. Consider repeat DCCV and use of antiarrhythmics (Amiodarone) to sustain NSR. Pt refusing at this time. Wishes to resume rate control management strategies. Resume Eliquis as prescribed. \par \par 2. HLD: resume statin therapy as prescribed and regular f/u with Cardiologist for routine lipid monitoring and management.\par \par 3. Mod-Severe MR: mod-severe MR noted on ECHO in 2018. Had repeat ECHO with Dr. Jerry, unsure of status. Possible improved MR in NSR, unknown given persistent afib since 2018. Will see Dr. Jerry for f/u and ECHO if needed. \par \par Will continue f/u with Cardiologist and may RTO as needed or if any new or worsening symptoms or findings occur.

## 2023-03-30 RX ORDER — APIXABAN 5 MG/1
5 TABLET, FILM COATED ORAL
Qty: 180 | Refills: 3 | Status: ACTIVE | COMMUNITY
Start: 2018-09-14 | End: 1900-01-01

## 2023-07-24 ENCOUNTER — NON-APPOINTMENT (OUTPATIENT)
Age: 78
End: 2023-07-24

## 2023-09-29 NOTE — DISCHARGE NOTE ADULT - CARE PROVIDER_API CALL
Patient called back, informed the message below,instructed to  the form at the  and give it to her parents for completion, stated understanding. OFFICE STAFF=please give the form to the , patient will pick it up. Fransico Chu (MD), Cardiac Electrophysiology; Cardiovascular Disease; Internal Medicine  [id number] [place] [address]  Phone: [phone]  Fax: [phone]

## 2023-12-28 NOTE — H&P PST ADULT - MOUTH
[facility]   [facility]   [address]   [address]   [place] [address]   [phone] Fax: [phone]    Discharge Note      Referring Physician: Amarilis Hugo A.P.R.N    Wound Etiology: IV infiltration   Wound location: Left anterior upper arm   Date of Discharge: 12/28/2023    Assessment:  Discharge patient at this time secondary to wound resolution.    Patient educated on skin hygiene and scar maintenance.     Thank you for the referral and the opportunity to treat your patient.               normal mouth and gums/moist

## 2024-11-05 NOTE — ED PROVIDER NOTE - NS ED ROS FT
NURSING INTAKE COMMENTS:   Chief Complaint   Patient presents with    Knee Pain     F/u right knee pain 7/10 has swelling on RLE. Has schedule sx for 12/06/2024.       HPI: This 76 year old male presents today for follow-up of some residual right lower extremity swelling following his knee scope.  He reports that overall the swelling has improved, but still wanted to be evaluated prior to his surgery.  He denies any fever, chills, night sweats.  Denies any numbness or tingling.  Denies calf pain.  He reports that he has compression stockings, but has not been wearing them.  I discussed with him that to help with circulation we typically recommend wearing compression stockings during the day but not at night.  He said that he would start wearing them.    Past Medical History:    Basal cell carcinoma    left nose    Basal cell carcinoma of nose    BCC (basal cell carcinoma)     left posterior lateral neck    BCC (basal cell carcinoma)    right ala base    Calculus of kidney    left    Chronic headaches    Chronic pansinusitis    Chronic sinusitis    Congestion of nasal sinus    COVID-19    Esophageal reflux    Ganglion of flexor tendon sheath of right thumb    High cholesterol    Melanoma (HCC)    left arm, stage I; .75 mm depth    Osteoarthritis    Plantar wart    right heel    Pneumonia due to organism    2016    Visual impairment    Glasses     Past Surgical History:   Procedure Laterality Date    Adj tiss xfer lid,nos,ear <10sqcm Right 2-17-17    Exc lesion of nose, V_Y flap    Colonoscopy      Colonoscopy      Colonoscopy N/A 12/26/2018    Procedure: COLONOSCOPY;  Surgeon: Isidoro Mosley MD;  Location: [facility] ENDOSCOPY    Exc skin malig 2.1-3cm face,facial  05/18/2022    Excis tendon sheath lesn,hand/fingr Right 2-17-17    Exc ganglion R thumb    Foot surgery Left 2006    Nerve procedure    Full graft proc head,fac,hand <20sqc  05/18/2022    Nasal scopy,remv part ethmoid      Nasal scopy,rmv tiss maxill sinus       Repair of nasal septum  1985    Repair of nasal septum      Repr cmpl wnd head,fac,hand 2.6-7.5  2022    Skin surgery Left 10/07/16    left upper arm melanoma     Current Outpatient Medications   Medication Sig Dispense Refill    rosuvastatin 10 MG Oral Tab Take 1 tablet (10 mg total) by mouth nightly. 90 tablet 3    Acetaminophen 500 MG Oral Cap Take 2 capsules (1,000 mg total) by mouth every 8 (eight) hours as needed for Pain. Never exceed 3000 mg in a 24-hour period.  Many over-the-counter medications also have acetaminophen in them. 180 capsule 0    dutasteride 0.5 MG Oral Cap Take 1 capsule (0.5 mg total) by mouth daily. 90 capsule 3    sildenafil 20 MG Oral Tab Take one tablet PO daily as needed 10 tablet 5     Allergies[1]  Family History   Problem Relation Age of Onset    Dementia Mother     Seizure Disorder Mother     Hypertension Mother     Cancer Mother         skin    Cancer Father         Leukemia     No family Hx of DVT/PE    Social History     Occupational History    Occupation:    Tobacco Use    Smoking status: Former     Current packs/day: 0.00     Types: Cigarettes     Quit date: 3/25/1978     Years since quittin.6     Passive exposure: Never    Smokeless tobacco: Never   Vaping Use    Vaping status: Never Used   Substance and Sexual Activity    Alcohol use: Yes     Comment: rarely maybe a glass of wine a week    Drug use: Yes     Types: Cannabis     Comment: gummies    Sexual activity: Not Currently        Review of Systems:  GENERAL: feels generally well, no significant weight loss or weight gain  SKIN: no ulcerated or worrisome skin lesions  EYES:denies blurred vision or double vision  HEENT: denies new nasal congestion, sinus pain or ST  LUNGS: denies shortness of breath  CARDIOVASCULAR: denies chest pain  GI: no hematemesis, no worsening heartburn, no diarrhea  : no dysuria, no blood in urine, no difficulty urinating, no incontinence  MUSCULOSKELETAL: no  other musculoskeletal complaints other than in HPI  NEURO: no numbness or tingling, no weakness or balance disorder  PSYCHE: no depression or anxiety  HEMATOLOGIC: no hx of blood dyscrasia, no Hx DVT/PE  ENDOCRINE: no thyroid or diabetes issues  ALL/ASTHMA: no new hx of severe allergy or asthma    Physical Examination:    There were no vitals taken for this visit.  Constitutional: appears well hydrated, alert and responsive, no acute distress noted  Extremities: Lower extremity skin healthy, very mild trace edema.  Calf soft nontender.  Musculoskeletal: Right knee range of motion 0 to 130 degrees, no instability.  Focally tender to medial joint line.  Minimal tenderness to lateral joint line.  Patellofemoral grind negative for crepitus and pain.  Able to straight leg raise against resistance.  Able to dorsiflex and plantarflex against resistance.  Neurological: Motor and sensory function intact.    Imaging: None taken today.      No results found.     Lab Results   Component Value Date    WBC 9.1 10/16/2024    HGB 17.1 10/16/2024    .0 10/16/2024      Lab Results   Component Value Date    GLU 84 10/16/2024    BUN 18 10/16/2024    CREATSERUM 0.90 10/16/2024    GFRNAA 89 06/20/2022    GFRAA 102 06/20/2022        Assessment and Plan:  Diagnoses and all orders for this visit:    Primary osteoarthritis of right knee    Chronic pain of right knee    Right knee pain, unspecified chronicity        Assessment: As above    Plan: Patient is scheduled for right total knee arthroplasty on 12/6/2024.  I answered the rest of his questions to his satisfaction.  He is planning on potentially going to an acute rehab, as he lives by himself.  For his continued minimal right lower extremity swelling, discussed he should begin wearing his compression stockings during the day, but not wear them at night.  He has an appointment to see his primary care provider next week for medical clearance.  We will see him on 12/6/2024 for his  right total knee arthroplasty.    Follow Up: No follow-ups on file.    SOURAV Arthur       [1]   Allergies  Allergen Reactions    Penicillin G HIVES     states he is not sure if it was from actual drug  Denies skin peeling, blisters or organ damage    Penicillins HIVES     Denies blisters, skin peeling or organ damage      General: denies fever, chills, weight loss/weight gain.  HENT: denies nasal congestion, sore throat, rhinorrhea, ear pain.  Eyes: denies visual changes, blurred vision, eye discharge, eye redness.  Neck: denies neck pain, neck swelling.  CV: denies chest pain, palpitations.  Resp: denies difficulty breathing, cough.  Abdominal: +nausea, vomiting, diarrhea; denies abdominal pain, blood in stool, dark stool.  MSK: denies muscle aches, bony pain, leg pain, leg swelling.  Neuro: +LOC after head trauma; denies headaches, numbness, tingling, dizziness, lightheadedness.  Skin: denies rashes, cuts, bruises.  Hematologic: denies unexplained bruises.

## 2025-02-15 NOTE — CHART NOTE - NSCHARTNOTEFT_GEN_A_CORE
This is a 76 year old man with a pmhx of HLD, BPH, and persistent afib  s/p failed DCCVs and s/p PVI and CTI ablation who present to [facility] for elective afib ablation s/p Afib ablation on 6/29/2022    Physical Exam:  General: Alert, NAD, lying in bed    Plan:  s/p Ablation:  Post-op ablation instruction has been verbally explained and given to the patient. Patient expressed understanding and all questions were answered   Patient is schedule for an appointment on 8/2/2022 at 11:00am  Remove the bandage after 24-48 hours  No scrubbing the incision site for 7 days   No lifting more than 5lb or exertional exercising such as jogging, running, bike riding for 7 days  No swimming pool, Jacuzzi, or bath for 5 days.   Patient can shower 24 hours after procedure . Pat the area dry  Pt was instructed to call [phone] if the following occurs:      - fever with temperature > 100.6      - swelling, drainage or bleeding at the site incision       - chest pain, SOB, n/v    Alek Bird PA-C 20

## 2025-05-02 PROBLEM — Z87.19 PERSONAL HISTORY OF OTHER DISEASES OF THE DIGESTIVE SYSTEM: Chronic | Status: ACTIVE | Noted: 2022-10-28

## 2025-05-06 ENCOUNTER — NON-APPOINTMENT (OUTPATIENT)
Age: 80
End: 2025-05-06

## 2025-05-06 ENCOUNTER — RESULT REVIEW (OUTPATIENT)
Age: 80
End: 2025-05-06

## 2025-05-06 ENCOUNTER — APPOINTMENT (OUTPATIENT)
Dept: UROLOGY | Facility: CLINIC | Age: 80
End: 2025-05-06
Payer: MEDICARE

## 2025-05-06 ENCOUNTER — APPOINTMENT (OUTPATIENT)
Dept: MRI IMAGING | Facility: CLINIC | Age: 80
End: 2025-05-06
Payer: MEDICARE

## 2025-05-06 ENCOUNTER — OUTPATIENT (OUTPATIENT)
Dept: OUTPATIENT SERVICES | Facility: HOSPITAL | Age: 80
LOS: 1 days | End: 2025-05-06
Payer: MEDICARE

## 2025-05-06 VITALS
HEART RATE: 73 BPM | DIASTOLIC BLOOD PRESSURE: 58 MMHG | HEIGHT: 69 IN | SYSTOLIC BLOOD PRESSURE: 95 MMHG | BODY MASS INDEX: 24.88 KG/M2 | RESPIRATION RATE: 17 BRPM | TEMPERATURE: 98.4 F | WEIGHT: 168 LBS

## 2025-05-06 DIAGNOSIS — Z98.890 OTHER SPECIFIED POSTPROCEDURAL STATES: Chronic | ICD-10-CM

## 2025-05-06 DIAGNOSIS — N28.89 OTHER SPECIFIED DISORDERS OF KIDNEY AND URETER: ICD-10-CM

## 2025-05-06 DIAGNOSIS — K66.8 OTHER SPECIFIED DISORDERS OF PERITONEUM: Chronic | ICD-10-CM

## 2025-05-06 PROCEDURE — 72197 MRI PELVIS W/O & W/DYE: CPT | Mod: 26

## 2025-05-06 PROCEDURE — 74183 MRI ABD W/O CNTR FLWD CNTR: CPT

## 2025-05-06 PROCEDURE — 74183 MRI ABD W/O CNTR FLWD CNTR: CPT | Mod: 26

## 2025-05-06 PROCEDURE — 72197 MRI PELVIS W/O & W/DYE: CPT

## 2025-05-06 PROCEDURE — 99204 OFFICE O/P NEW MOD 45 MIN: CPT

## 2025-05-06 PROCEDURE — A9585: CPT
